# Patient Record
Sex: FEMALE | Race: WHITE | Employment: OTHER | URBAN - METROPOLITAN AREA
[De-identification: names, ages, dates, MRNs, and addresses within clinical notes are randomized per-mention and may not be internally consistent; named-entity substitution may affect disease eponyms.]

---

## 2018-02-23 RX ORDER — CLONAZEPAM 0.12 MG/1
TABLET, ORALLY DISINTEGRATING ORAL
Refills: 0 | Status: ON HOLD | COMMUNITY
Start: 2017-12-06 | End: 2019-04-16 | Stop reason: SDUPTHER

## 2018-02-23 RX ORDER — LISINOPRIL 40 MG/1
20 TABLET ORAL DAILY
COMMUNITY

## 2018-02-23 RX ORDER — AMLODIPINE BESYLATE 2.5 MG/1
10 TABLET ORAL DAILY
COMMUNITY

## 2018-02-23 RX ORDER — BUDESONIDE AND FORMOTEROL FUMARATE DIHYDRATE 160; 4.5 UG/1; UG/1
AEROSOL RESPIRATORY (INHALATION)
COMMUNITY
Start: 2017-12-14

## 2018-02-23 RX ORDER — ASPIRIN 325 MG
1 TABLET, DELAYED RELEASE (ENTERIC COATED) ORAL
COMMUNITY

## 2018-02-23 RX ORDER — ATORVASTATIN CALCIUM 40 MG/1
40 TABLET, FILM COATED ORAL DAILY
COMMUNITY

## 2018-02-23 RX ORDER — GUAIFENESIN 100 MG/5ML
81 LIQUID (ML) ORAL DAILY
COMMUNITY

## 2018-02-23 RX ORDER — HYDROCHLOROTHIAZIDE 12.5 MG/1
12.5 TABLET ORAL DAILY
COMMUNITY
End: 2019-04-16

## 2018-02-23 RX ORDER — LAMOTRIGINE 100 MG/1
TABLET ORAL
Refills: 0 | COMMUNITY
Start: 2017-12-26

## 2018-02-23 RX ORDER — PHENOL/SODIUM PHENOLATE
40 AEROSOL, SPRAY (ML) MUCOUS MEMBRANE DAILY
COMMUNITY

## 2018-02-23 RX ORDER — LEVOTHYROXINE SODIUM 88 UG/1
100 TABLET ORAL
COMMUNITY

## 2018-02-27 ENCOUNTER — ANESTHESIA EVENT (OUTPATIENT)
Dept: ENDOSCOPY | Age: 73
End: 2018-02-27
Payer: MEDICARE

## 2018-02-28 ENCOUNTER — ANESTHESIA (OUTPATIENT)
Dept: ENDOSCOPY | Age: 73
End: 2018-02-28
Payer: MEDICARE

## 2018-02-28 ENCOUNTER — HOSPITAL ENCOUNTER (OUTPATIENT)
Age: 73
Setting detail: OUTPATIENT SURGERY
Discharge: HOME | End: 2018-02-28
Attending: INTERNAL MEDICINE | Admitting: INTERNAL MEDICINE
Payer: MEDICARE

## 2018-02-28 VITALS
BODY MASS INDEX: 28.36 KG/M2 | WEIGHT: 135.13 LBS | HEIGHT: 58 IN | OXYGEN SATURATION: 97 % | TEMPERATURE: 98.5 F | SYSTOLIC BLOOD PRESSURE: 129 MMHG | RESPIRATION RATE: 16 BRPM | DIASTOLIC BLOOD PRESSURE: 61 MMHG | HEART RATE: 62 BPM

## 2018-02-28 PROBLEM — Z86.010 ENCOUNTER FOR COLONOSCOPY DUE TO HISTORY OF ADENOMATOUS COLONIC POLYPS: Status: ACTIVE | Noted: 2018-02-28

## 2018-02-28 PROBLEM — K22.70 BARRETT'S ESOPHAGUS DETERMINED BY BIOPSY: Status: ACTIVE | Noted: 2018-02-28

## 2018-02-28 PROBLEM — Z12.11 ENCOUNTER FOR COLONOSCOPY DUE TO HISTORY OF ADENOMATOUS COLONIC POLYPS: Status: ACTIVE | Noted: 2018-02-28

## 2018-02-28 LAB
BUN BLD-MCNC: 16 MG/DL (ref 7–18)
CHLORIDE BLD-SCNC: 103 MMOL/L (ref 100–108)
GLUCOSE BLD STRIP.AUTO-MCNC: 92 MG/DL (ref 74–106)
HCT VFR BLD CALC: 35 % (ref 36–49)
HGB BLD-MCNC: 11.9 G/DL (ref 12–16)
POTASSIUM BLD-SCNC: 3.8 MMOL/L (ref 3.5–5.5)
SODIUM BLD-SCNC: 140 MMOL/L (ref 136–145)

## 2018-02-28 PROCEDURE — 88342 IMHCHEM/IMCYTCHM 1ST ANTB: CPT | Performed by: INTERNAL MEDICINE

## 2018-02-28 PROCEDURE — 74011250636 HC RX REV CODE- 250/636

## 2018-02-28 PROCEDURE — 74011250636 HC RX REV CODE- 250/636: Performed by: NURSE ANESTHETIST, CERTIFIED REGISTERED

## 2018-02-28 PROCEDURE — 77030038604 HC SNR ENDO EXACTO USEN -B: Performed by: INTERNAL MEDICINE

## 2018-02-28 PROCEDURE — 74011000250 HC RX REV CODE- 250

## 2018-02-28 PROCEDURE — 76040000007: Performed by: INTERNAL MEDICINE

## 2018-02-28 PROCEDURE — 76060000032 HC ANESTHESIA 0.5 TO 1 HR: Performed by: INTERNAL MEDICINE

## 2018-02-28 PROCEDURE — 84295 ASSAY OF SERUM SODIUM: CPT

## 2018-02-28 PROCEDURE — 77030009426 HC FCPS BIOP ENDOSC BSC -B: Performed by: INTERNAL MEDICINE

## 2018-02-28 PROCEDURE — 77030031670 HC DEV INFL ENDOTEK BIG60 MRTM -B: Performed by: INTERNAL MEDICINE

## 2018-02-28 PROCEDURE — 88305 TISSUE EXAM BY PATHOLOGIST: CPT | Performed by: INTERNAL MEDICINE

## 2018-02-28 RX ORDER — SODIUM CHLORIDE 9 MG/ML
25 INJECTION, SOLUTION INTRAVENOUS CONTINUOUS
Status: CANCELLED | OUTPATIENT
Start: 2018-02-28 | End: 2018-02-28

## 2018-02-28 RX ORDER — SODIUM CHLORIDE, SODIUM LACTATE, POTASSIUM CHLORIDE, CALCIUM CHLORIDE 600; 310; 30; 20 MG/100ML; MG/100ML; MG/100ML; MG/100ML
75 INJECTION, SOLUTION INTRAVENOUS CONTINUOUS
Status: DISCONTINUED | OUTPATIENT
Start: 2018-02-28 | End: 2018-02-28 | Stop reason: HOSPADM

## 2018-02-28 RX ORDER — HYDRALAZINE HYDROCHLORIDE 20 MG/ML
INJECTION INTRAMUSCULAR; INTRAVENOUS AS NEEDED
Status: DISCONTINUED | OUTPATIENT
Start: 2018-02-28 | End: 2018-02-28 | Stop reason: HOSPADM

## 2018-02-28 RX ORDER — SODIUM CHLORIDE 0.9 % (FLUSH) 0.9 %
5-10 SYRINGE (ML) INJECTION EVERY 8 HOURS
Status: CANCELLED | OUTPATIENT
Start: 2018-02-28 | End: 2018-02-28

## 2018-02-28 RX ORDER — LIDOCAINE HYDROCHLORIDE 10 MG/ML
0.1 INJECTION, SOLUTION EPIDURAL; INFILTRATION; INTRACAUDAL; PERINEURAL AS NEEDED
Status: DISCONTINUED | OUTPATIENT
Start: 2018-02-28 | End: 2018-02-28 | Stop reason: HOSPADM

## 2018-02-28 RX ORDER — SODIUM CHLORIDE 0.9 % (FLUSH) 0.9 %
5-10 SYRINGE (ML) INJECTION AS NEEDED
Status: CANCELLED | OUTPATIENT
Start: 2018-02-28 | End: 2018-02-28

## 2018-02-28 RX ORDER — FAMOTIDINE 20 MG/1
20 TABLET, FILM COATED ORAL ONCE
Status: DISCONTINUED | OUTPATIENT
Start: 2018-02-28 | End: 2018-02-28 | Stop reason: HOSPADM

## 2018-02-28 RX ORDER — LIDOCAINE HYDROCHLORIDE 20 MG/ML
INJECTION, SOLUTION EPIDURAL; INFILTRATION; INTRACAUDAL; PERINEURAL AS NEEDED
Status: DISCONTINUED | OUTPATIENT
Start: 2018-02-28 | End: 2018-02-28 | Stop reason: HOSPADM

## 2018-02-28 RX ORDER — DIPHENHYDRAMINE HYDROCHLORIDE 50 MG/ML
25 INJECTION, SOLUTION INTRAMUSCULAR; INTRAVENOUS
Status: CANCELLED | OUTPATIENT
Start: 2018-02-28

## 2018-02-28 RX ORDER — DEXTROMETHORPHAN/PSEUDOEPHED 2.5-7.5/.8
1.2 DROPS ORAL
Status: CANCELLED | OUTPATIENT
Start: 2018-02-28

## 2018-02-28 RX ORDER — PROPOFOL 10 MG/ML
INJECTION, EMULSION INTRAVENOUS AS NEEDED
Status: DISCONTINUED | OUTPATIENT
Start: 2018-02-28 | End: 2018-02-28 | Stop reason: HOSPADM

## 2018-02-28 RX ORDER — PROPOFOL 10 MG/ML
INJECTION, EMULSION INTRAVENOUS
Status: DISCONTINUED | OUTPATIENT
Start: 2018-02-28 | End: 2018-02-28 | Stop reason: HOSPADM

## 2018-02-28 RX ORDER — SODIUM CHLORIDE, SODIUM LACTATE, POTASSIUM CHLORIDE, CALCIUM CHLORIDE 600; 310; 30; 20 MG/100ML; MG/100ML; MG/100ML; MG/100ML
75 INJECTION, SOLUTION INTRAVENOUS CONTINUOUS
Status: CANCELLED | OUTPATIENT
Start: 2018-02-28

## 2018-02-28 RX ADMIN — SODIUM CHLORIDE, SODIUM LACTATE, POTASSIUM CHLORIDE, AND CALCIUM CHLORIDE 75 ML/HR: 600; 310; 30; 20 INJECTION, SOLUTION INTRAVENOUS at 10:49

## 2018-02-28 RX ADMIN — PROPOFOL 200 MCG/KG/MIN: 10 INJECTION, EMULSION INTRAVENOUS at 11:17

## 2018-02-28 RX ADMIN — HYDRALAZINE HYDROCHLORIDE 2.5 MG: 20 INJECTION INTRAMUSCULAR; INTRAVENOUS at 11:39

## 2018-02-28 RX ADMIN — HYDRALAZINE HYDROCHLORIDE 5 MG: 20 INJECTION INTRAMUSCULAR; INTRAVENOUS at 11:25

## 2018-02-28 RX ADMIN — HYDRALAZINE HYDROCHLORIDE 2.5 MG: 20 INJECTION INTRAMUSCULAR; INTRAVENOUS at 11:35

## 2018-02-28 RX ADMIN — LIDOCAINE HYDROCHLORIDE 50 MG: 20 INJECTION, SOLUTION EPIDURAL; INFILTRATION; INTRACAUDAL; PERINEURAL at 11:16

## 2018-02-28 RX ADMIN — PROPOFOL 60 MG: 10 INJECTION, EMULSION INTRAVENOUS at 11:17

## 2018-02-28 NOTE — IP AVS SNAPSHOT
[address] [address] [address] 
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 
[phone] Discharge Orders None A check mary indicates which time of day the medication should be taken. My Medications CONTINUE taking these medications Instructions Each Dose to Equal  
 Morning Noon Evening Bedtime  
 aspirin 81 mg chewable tablet Your last dose was: Your next dose is: Take 81 mg by mouth daily. 81 mg Cholecalciferol (Vitamin D3) 50,000 unit Cap Your last dose was: Your next dose is: Take 1 Cap by mouth every seven (7) days. 1 Cap  
    
   
   
   
  
 clonazePAM 0.125 mg disintegrating tablet Commonly known as:  Merilee Blind Your last dose was: Your next dose is:    
   
   
 place 1 tablet by mouth once daily if needed for anxiety  
     
   
   
   
  
 hydroCHLOROthiazide 12.5 mg tablet Commonly known as:  HYDRODIURIL Your last dose was: Your next dose is: Take 12.5 mg by mouth daily. 12.5 mg  
    
   
   
   
  
 lamoTRIgine 100 mg tablet Commonly known as: LaMICtal  
   
Your last dose was: Your next dose is:    
   
   
 take 2 tablets by mouth at bedtime  
     
   
   
   
  
 levothyroxine 88 mcg tablet Commonly known as:  SYNTHROID  
 Your last dose was: Your next dose is: Take 88 mcg by mouth Daily (before breakfast). 88 mcg LIPITOR 40 mg tablet Generic drug:  atorvastatin Your last dose was: Your next dose is: Take 40 mg by mouth daily. 40 mg  
    
   
   
   
  
 lisinopril 40 mg tablet Commonly known as:  Alcario Phuc Your last dose was: Your next dose is: Take 40 mg by mouth daily. 40 mg  
    
   
   
   
  
 NORVASC 2.5 mg tablet Generic drug:  amLODIPine Your last dose was: Your next dose is: Take 2.5 mg by mouth daily. 2.5 mg Omeprazole delayed release 20 mg tablet Commonly known as:  PRILOSEC D/R Your last dose was: Your next dose is: Take 20 mg by mouth daily. 20 mg  
    
   
   
   
  
 SYMBICORT 160-4.5 mcg/actuation Hfaa Generic drug:  budesonide-formoterol Your last dose was: Your next dose is:    
   
   
      
   
   
   
  
  
  
  
Discharge Instructions RECOMMENDATIONS: 
1. Call me in 2-3 weeks for esophageal biopsies. If no dysplasia, then repeat EGD in 3 years 2. Continue current anti-reflux medication 3. Tino me in 2-3 weeks for polyp biopsy result if not received beforehand. If adenoma or hyperplastic, repeat colonoscopy in 5 years b/o adenomas in the past 
 
 
  
Upper GI Endoscopy: What to Expect at [facility] Your Recovery After you have an endoscopy, you will stay at the hospital or clinic for 1 to 2 hours. This will allow the medicine to wear off. You will be able to go home after your doctor or nurse checks to make sure you are not having any problems.  
You may have to stay overnight if you had treatment during the test. You may have a sore throat for a day or two after the test. 
This care sheet gives you a general idea about what to expect after the test. 
 How can you care for yourself at home? Activity · Rest as much as you need to after you go home. · You should be able to go back to your usual activities the day after the test. 
Diet · Follow your doctor's directions for eating after the test. 
· Drink plenty of fluids (unless your doctor has told you not to). Medications · If you have a sore throat the day after the test, use an over-the-counter spray to numb your throat. Follow-up care is a key part of your treatment and safety. Be sure to make and go to all appointments, and call your doctor if you are having problems. It's also a good idea to know your test results and keep a list of the medicines you take. When should you call for help? Call 911 anytime you think you may need emergency care. For example, call if: 
? · You passed out (loses consciousness). ? · You have trouble breathing. ? · You pass maroon or bloody stools. ?Call your doctor now or seek immediate medical care if: 
? · You have pain that does not get better after your take pain medicine. ? · You have new or worse belly pain. ? · You have blood in your stools. ? · You are sick to your stomach and cannot keep fluids down. ? · You have a fever. ? · You cannot pass stools or gas. ? Watch closely for changes in your health, and be sure to contact your doctor if: 
? · Your throat still hurts after a day or two. ? · You do not get better as expected. Where can you learn more? Go to http://nahed-yasmeen.info/. Enter [phone] in the search box to learn more about \"Upper GI Endoscopy: What to Expect at Home. \" Current as of: May 12, 2017 Content Version: 11.4 © 5916-7628 [employer]. Care instructions adapted under license by [employer] (which disclaims liability or warranty for this information).  If you have questions about a medical condition or this instruction, always ask your healthcare professional. Jania Sanz, [facility] disclaims any warranty or liability for your use of this information. Colonoscopy: What to Expect at [facility] Your Recovery After you have a colonoscopy, you will stay at the clinic for 1 to 2 hours until the medicines wear off. Then you can go home. But you will need to arrange for a ride. Your doctor will tell you when you can eat and do your other usual activities. Your doctor will talk to you about when you will need your next colonoscopy. Your doctor can help you decide how often you need to be checked. This will depend on the results of your test and your risk for colorectal cancer. After the test, you may be bloated or have gas pains. You may need to pass gas. If a biopsy was done or a polyp was removed, you may have streaks of blood in your stool (feces) for a few days. This care sheet gives you a general idea about how long it will take for you to recover. But each person recovers at a different pace. Follow the steps below to get better as quickly as possible. How can you care for yourself at home? Activity ? · Rest when you feel tired. ? · You can do your normal activities when it feels okay to do so. Diet ? · Follow your doctor's directions for eating. ? · Unless your doctor has told you not to, drink plenty of fluids. This helps to replace the fluids that were lost during the colon prep. ? · Do not drink alcohol. Medicines ? · Your doctor will tell you if and when you can restart your medicines. He or she will also give you instructions about taking any new medicines. ? · If you take blood thinners, such as warfarin (Coumadin), clopidogrel (Plavix), or aspirin, be sure to talk to your doctor. He or she will tell you if and when to start taking those medicines again. Make sure that you understand exactly what your doctor wants you to do.   
? · If polyps were removed or a biopsy was done during the test, your doctor may tell you not to take aspirin or other anti-inflammatory medicines for a few days. These include ibuprofen (Advil, Motrin) and naproxen (Aleve). Other instructions ? · For your safety, do not drive or operate machinery until the medicine wears off and you can think clearly. Your doctor may tell you not to drive or operate machinery until the day after your test.  
? · Do not sign legal documents or make major decisions until the medicine wears off and you can think clearly. The anesthesia can make it hard for you to fully understand what you are agreeing to. Follow-up care is a key part of your treatment and safety. Be sure to make and go to all appointments, and call your doctor if you are having problems. It's also a good idea to know your test results and keep a list of the medicines you take. When should you call for help? Call 911 anytime you think you may need emergency care. For example, call if: 
? · You passed out (lost consciousness). ? · You pass maroon or bloody stools. ? · You have trouble breathing. ?Call your doctor now or seek immediate medical care if: 
? · You have pain that does not get better after you take pain medicine. ? · You are sick to your stomach or cannot drink fluids. ? · You have new or worse belly pain. ? · You have blood in your stools. ? · You have a fever. ? · You cannot pass stools or gas. ? Watch closely for changes in your health, and be sure to contact your doctor if you have any problems. Where can you learn more? Go to http://nahed-yasmeen.info/. Enter E264 in the search box to learn more about \"Colonoscopy: What to Expect at Home. \" Current as of: May 12, 2017 Content Version: 11.4 © 2913-6566 [employer]. Care instructions adapted under license by [employer] (which disclaims liability or warranty for this information).  If you have questions about a medical condition or this instruction, always ask your healthcare professional. [address] [address] any warranty or liability for your use of this information. DISCHARGE SUMMARY from Nurse PATIENT INSTRUCTIONS: 
 
After general anesthesia or intravenous sedation, for 24 hours or while taking prescription Narcotics: · Limit your activities · Do not drive and operate hazardous machinery · Do not make important personal or business decisions · Do  not drink alcoholic beverages · If you have not urinated within 8 hours after discharge, please contact your surgeon on call. Report the following to your surgeon: 
· Excessive pain, swelling, redness or odor of or around the surgical area · Temperature over 100.5 · Nausea and vomiting lasting longer than 4 hours or if unable to take medications · Any signs of decreased circulation or nerve impairment to extremity: change in color, persistent  numbness, tingling, coldness or increase pain · Any questions What to do at Home: No smoking/ No tobacco products/ Avoid exposure to second hand smoke Surgeon General's Warning:  Quitting smoking now greatly reduces serious risk to your health. Obesity, smoking, and sedentary lifestyle greatly increases your risk for illness A healthy diet, regular physical exercise & weight monitoring are important for maintaining a healthy lifestyle You may be retaining fluid if you have a history of heart failure or if you experience any of the following symptoms:  Weight gain of 3 pounds or more overnight or 5 pounds in a week, increased swelling in our hands or feet or shortness of breath while lying flat in bed. Please call your doctor as soon as you notice any of these symptoms; do not wait until your next office visit. Recognize signs and symptoms of STROKE: 
 
F-face looks uneven A-arms unable to move or move unevenly S-speech slurred or non-existent T-time-call 911 as soon as signs and symptoms begin-DO NOT go Back to bed or wait to see if you get better-TIME IS BRAIN. Warning Signs of HEART ATTACK Call 911 if you have these symptoms: 
? Chest discomfort. Most heart attacks involve discomfort in the center of the chest that lasts more than a few minutes, or that goes away and comes back. It can feel like uncomfortable pressure, squeezing, fullness, or pain. ? Discomfort in other areas of the upper body. Symptoms can include pain or discomfort in one or both arms, the back, neck, jaw, or stomach. ? Shortness of breath with or without chest discomfort. ? Other signs may include breaking out in a cold sweat, nausea, or lightheadedness. Don't wait more than five minutes to call [address]! Fast action can save your life. Calling 911 is almost always the fastest way to get lifesaving treatment. Emergency Medical Services staff can begin treatment when they arrive  up to an hour sooner than if someone gets to the hospital by car. The discharge information has been reviewed with the patient. The patient verbalized understanding. Discharge medications reviewed with the patient and appropriate educational materials and side effects teaching were provided. Patient armband removed and given to patient to take home. Patient was informed of the privacy risks if armband lost or stolen Introducing [facility]! [employer] introduces [employer] patient portal. Now you can access parts of your medical record, email your doctor's office, and request medication refills online. 1. In your internet browser, go to [email or web address] 2. Click on the First Time User? Click Here link in the Sign In box. You will see the New Member Sign Up page. 3. Enter your [employer] Access Code exactly as it appears below. You will not need to use this code after youve completed the sign-up process.  If you do not sign up before the expiration date, you must request a new code. · Pharmacopeia Access Code: [email or web address] Expires: 4/8/2018  8:40 AM 
 
4. Enter the last four digits of your Social Security Number (xxxx) and Date of Birth (mm/dd/yyyy) as indicated and click Submit. You will be taken to the next sign-up page. 5. Create a Pharmacopeia ID. This will be your Pharmacopeia login ID and cannot be changed, so think of one that is secure and easy to remember. 6. Create a Pharmacopeia password. You can change your password at any time. 7. Enter your Password Reset Question and Answer. This can be used at a later time if you forget your password. 8. Enter your e-mail address. You will receive e-mail notification when new information is available in [address] 9. Click Sign Up. You can now view and download portions of your medical record. 10. Click the Download Summary menu link to download a portable copy of your medical information. If you have questions, please visit the Frequently Asked Questions section of the Pharmacopeia website. Remember, Pharmacopeia is NOT to be used for urgent needs. For medical emergencies, dial 911. Now available from your iPhone and Android! Providers Seen During Your Hospitalization Provider Specialty Primary office phone Ara Adkins MD Gastroenterology [phone] Your Primary Care Physician (PCP) Primary Care Physician Office Phone Office Fax Cindy Esquivel [phone] You are allergic to the following No active allergies Recent Documentation Height Weight Breastfeeding? BMI OB Status Smoking Status 1.473 m 61.3 kg No 28.24 kg/m2 Menopause Former Smoker Emergency Contacts Name Discharge Info Relation Home Work Mobile [address] CAREGIVER [3] Friend [5] [phone] Patient Belongings The following personal items are in your possession at time of discharge: Dental Appliances: At bedside  Visual Aid: Glasses, At bedside          Jewelry: Necklace, With patient  Clothing: Footwear, Pants, Shirt    Other Valuables: Eyeglasses Please provide this summary of care documentation to your next provider. Signatures-by signing, you are acknowledging that this After Visit Summary has been reviewed with you and you have received a copy. Patient Signature:  ____________________________________________________________ Date:  ____________________________________________________________  
  
Sagrario Deleon Provider Signature:  ____________________________________________________________ Date:  ____________________________________________________________

## 2018-02-28 NOTE — PROCEDURES
Endoscopy Procedure Note    Patient: Pawan Montano MRN: [id number]  SSN: [id number]    YOB: 1945  Age: 74 y.o. Sex: female      Date/Time:  2/28/2018 11:52 AM    Esophagogastroduodenoscopy (EGD) Procedure Report    Procedure: Esophagogastroduodenoscopy with biopsy    IMPRESSION:   1. Irregular Z-line with 2-3 mm undulations of gastric mucosa extending upward into the distal end of the tubular esophagus in several areas most easily seen with NBI and 2-3 small islands of gastric mucosa  from the Z line by normal squamous mucosa. Bx'd taken to r/o dysplasia to r/o neoplasm. No active esophagitis, mass, nodularity stricture  2.  2 cm hiatal hernia  3. Puckered mucosa greater curve mid-body---based biopsied to confirm healed ulcer---normal mucosal texture  4  Normal duodenal bulb and second portion    RECOMMENDATIONS:  1. Call me in 2-3 weeks for esophageal biopsies. If no dysplasia, then repeat EGD in 3 years  2. Continue current anti-reflux medication    Indication: Stone's esophagus----4-5 year surveillance to r/o dysplasia  :  Luly Rodarte MD  Referring Provider: Adam Ng MD  Assistants: Endoscopy RN-1: Manuel Hansen RN; Gail Degroot RN  History: The history and physical exam were reviewed and updated. Endoscope: [id number]  ASA: ASA 2 - Patient with mild systemic disease with no functional limitations  Mallampati: II (soft palate, uvula, fauces visible)  Sedation:  MAC anesthesia    Description of the procedure: The procedure was discussed with the patient including risks, benefits, alternatives including risks of iv sedation, bleeding, perforation and aspiration. A safety timeout was performed. The patient was placed in the left lateral decubitus position. A bite block was placed. The patient was given incremental doses of intravenous medication until moderate sedation was achieved.   The patients vital signs were monitored at all times including heart rate/rhythm, blood pressure and oxygen saturation. The endoscope was then passed under direct visualization into the esophagus, across the GE junction into the stomach and through the pylorus into the duodenal bulb and second portion. The endoscope was then slowly withdrawn while visualizing the mucosa. In the stomach a retroflexion was performed and gastric fundus and cardia visualized. .The endoscope was then slowly withdrawn. The patient was then transferred to recovery in stable condition. Findings: see impression above    Complications:   none    EBL: minimal    Discharge disposition:  Home in the company of  when able to ambulate    Dilcia Spencer.  MD [employer], Umair Hernandez, [address]  February 28, 2018  11:52 AM

## 2018-02-28 NOTE — PROCEDURES
Colonoscopy Report    Patient: Raymundo Young MRN: [id number]  SSN: [id number]    YOB: 1945  Age: 74 y.o. Sex: female      Date of Procedure: 2/28/2018    IMPRESSION:  1. Good prep---normal terminal ileal orfice   2.  2 polyps in the cecum removed with cold snare---6-9 mm sessile polyps as were 5-6 mm sessile polyps in the distal transverse colon and proximal left colon  3.  2 polyps removed with cold biopsy in the rectum  4. Mild diverticulosis sigmoid and left colon    RECOMMENDATIONS:  1. Call me in 2-3 weeks for polyp biopsy results if not received beforehand. If adenoma or hyperplastic, then repeat colon 5 years b/o adenomas in the past    Indication:  Hx adenomas--5 year exam  History: The history and physical exam were reviewed and updated. Procedure Performed: Colonoscopy biopsy, polypectomy (cold snare)  Endoscopist: Francesco Mosqueda MD  Assistant: Endoscopy RN-1: Angélica Olson RN; Sonia Horn RN   ASA: ASA 2 - Patient with mild systemic disease with no functional limitations  Mallampati Score: II (soft palate, uvula, fauces visible)  Sedation:  MAC anesthesia  Endoscope: [id number]  Extent of Examination:terminal ileum  Quality of Preparation: good    Technique: The procedure was discussed with the patient including risks, benefits, alternatives including risks of IV sedation, bleeding, perforation and missed polyp. A safety timeout was performed. The patient was placed in left lateral position. The patient was given incremental doses of IV medication to achieve moderate  sedation. The patients vital signs were monitored at all times including heart rate, rhythm, blood pressure and oxygen saturation. When adequate sedation was achieved a perianal inspection and a digital rectal exam were performed. The video colonoscope was introduced into the rectum and advanced under direct vision up to the cecum and into the terminal ileal orfice.   The cecum was identified by IC valve, appendiceal orifice and convergence of the tineal folds. Careful examination of the colonic mucosa was then performed on slow withdrawal of the endoscope. Retroflexion was performed in the rectum and the distal rectum visualized as was the anal canal.  The patient tolerated the procedure very well and was transferred to recovery area. Findings:  See impression above  EBL: minimal  Specimen:   ID Type Source Tests Collected by Time Destination   1 : bx healed ulcer at base Preservative Gastric  Polina Christianson MD 2/28/2018 1122 Pathology   2 : bx known barrets r/o dysplasia Preservative   Polina Christianson MD 2/28/2018 1122 Pathology   3 : cold snare polyps x2 Preservative Cecum  Polina Christianson MD 2/28/2018 1137 Pathology   4 : cold snare polyp distal transverse Preservative Colon, Transverse  Polina Christianson MD 2/28/2018 1142 Pathology   5 : cold snare polyp proximal left Preservative Colon  Polina Christianson MD 2/28/2018 1147 Pathology   6 : bx polyps x2 Preservative Rectum  Polina Christianson MD 2/28/2018 1150 Pathology       [address]  MD Justus, Gutierrez Preston, [facility]  February 28, 2018  12:11 PM

## 2018-02-28 NOTE — H&P
Reason for Appointment   1. Management of medical disease and medications prior to endoscopy   2. History of colon polyp     History of Present Illness   Gastroenterology:   The patient is a 74year old female who has been sent for a surveillance colonoscopy. The patient reports her last colonoscopy was done seven years ago by Dr. Debby Kimbrough, which revealed adenoma and hyperplastic polyps. She provides no explanation today as to why she is several years late in having her 5 year follow-up colonoscopy. She denies any active GI symptoms in talking to her today. The patient denies any abdominal pain, rectal bleeding or change in bowel habits. Current Medications   Taking    omeprazole 40 mg delayed release capsule 1 cap(s) orally once a day    Aspir 81 81 mg delayed release tablet 1 tab(s) orally once a day    Deplin 7.5 mg capsule 1 cap(s) orally once a day    citalopram 20 mg tablet 1 tab(s) orally once a day    clonazepam 0.125 mg tablet, disintegrating (Schedule IV Drug)    lamotrigine 100 mg tablet    atorvastatin 40 mg tablet 1 tab(s) orally once a day    escitalopram 10 mg tablet 1 tab(s) orally once a day    hydrochlorothiazide 12.5 mg tablet    Vitamin D 50,000 intl units capsule    levothyroxine 88 mcg (0.088 mg) tablet 1 tab(s) orally once a day    Discontinued    patient to call with meds patient to call with meds    Medication List reviewed and reconciled with the patient      Past Medical History   Stone's Esophagus. Colon polyp. Surgical History   Colonoscopy    EGD    DJD      Family History   Father: , Throat cancer, diagnosed with Cancer   Mother:    Negative for colonic polyps. Social History   Marital Status: . Occupation: Retired. Smoking   Tobacco use: former smoker Quit in   Patient uses other tobacco products: No  e-Cigarettes No  no Alcohol. no IV Drug use. no Drugs (Illicit).    no Blood transfusions, The patient has never received a blood transfusion. Allergies   N.K.D.A. Review of Systems   Complete review of systems was taken and reviewed with the patient on 12/06/2017 and will be scanned into the medical record. Positives will be noted in HPI. Negatives will not be listed here. Vital Signs   BMI 27.26, HR 66, /87, Wt 135, Ht 4'11\", RR 16.     Examination   Gastroenterology:  acut EGD July 2012 Dr Marlyn Valdez indication chronic acid reflux disease. Findings included short segment Stone's esophagus, 2 cm hiatal hernia, erythema in the stomach compatible with gastritis. Biopsies of the irregular Z line did in fact confirm Stone's esophagus. There was no dysplasia. Gastric biopsies were negative for H. pylori. They showed reactive gastritis. EGD 2010: Healed ulcer in the stomach, Stone's esophagus. Hiatal hernia. Physical Examination   General: Healthy-appearing pleasant female in no obvious distress. Nodes: No supraclavicular, axillary, cervical nodes palpable. Chest: Clear to auscultation with symmetric breath sounds and good air movement. Heart: First and second heart sounds are normal. No murmurs, rubs, gallops. Abdomen: Soft, nontender. Mass palpable. Bowel sounds are normal with no bruits. Assessments     1. History of colon polyps - Z86.010 (Primary), Adenomatous polyps in the past. Maximum 5 year surveillance colonoscopy interval is appropriate. She's 2 years overdue for her 5 year exam.     2. Barretts esophagus without dysplasia - K22.70, Stone's esophagus identified on EGD in 2000 and again in 2012. She is overdue by several years for her three-year follow-up endoscopy to rebiopsy the Stone's segment and again rule out dysplasia. She has no significant acid reflux breakthrough symptoms to report today or alarming symptoms. 3. Essential hypertension - I10     4. Other hyperlipidemia - E78.4     Treatment   1.  History of colon polyps   Start PEG - 3350 * with electrolytes powder for reconstitution, -, 240 mL, orally, as directed, 2 days, 1 jug, Refills 0  Start Metoclopramide Hydrochloride tablet, 10 mg, 1 tab(s), orally, 1 hr prior to each colon prep, 2 days, 2 tabs, Refills 0  IMAGING: Colonic Sitz Marker Transit Study: KUB at baseline, then on Day #3, then on Day #5. (Ordered for 01/09/2018)     BasMandy Santana 12/6/2017 11:34:57 AM > Order given to pt. She is going to [facility]. IMAGING: Colonoscopy with MODERATE SEDATION (Ordered for 01/09/2018)  Notes: 1, Colonoscopy is being scheduled for January 9 11:30 AM [facility]. Low residue diet for 1 week prior to procedure. PEG 3350/Reglan prep. 2. Maximum 5 year surveillance colonoscopy interval because of high-risk personal history and adenomatous polyps. 2. Barretts esophagus without dysplasia   IMAGING: EGD with MODERATE SEDATION in HOSPITAL (Ordered for 01/09/2018)  Notes: 1. Continue taking omeprazole 40 mg daily which is best taken 30 minutes before breakfast on a consistent basis. 2. EGD January 9 11:30 at [facility] to rebiopsy the Stone's segment and again rule out dysplasia. 3. The patient should notify me in between office visits despite regular omeprazole use she develops significant acid reflux breakthrough symptoms or difficulty swallowing.             No interval change in history or physical exam since last office visit

## 2018-02-28 NOTE — DISCHARGE INSTRUCTIONS
RECOMMENDATIONS:  1. Call me in 2-3 weeks for esophageal biopsies. If no dysplasia, then repeat EGD in 3 years  2. Continue current anti-reflux medication  3. Tino me in 2-3 weeks for polyp biopsy result if not received beforehand. If adenoma or hyperplastic, repeat colonoscopy in 5 years  b/o adenomas in the past                 Upper GI Endoscopy: What to Expect at [address] [place] [address]  After you have an endoscopy, you will stay at the hospital or clinic for 1 to 2 hours. This will allow the medicine to wear off. You will be able to go home after your doctor or nurse checks to make sure you are not having any problems. You may have to stay overnight if you had treatment during the test. You may have a sore throat for a day or two after the test.  This care sheet gives you a general idea about what to expect after the test.  How can you care for yourself at home? Activity  · Rest as much as you need to after you go home. · You should be able to go back to your usual activities the day after the test.  Diet  · Follow your doctor's directions for eating after the test.  · Drink plenty of fluids (unless your doctor has told you not to). Medications  · If you have a sore throat the day after the test, use an over-the-counter spray to numb your throat. Follow-up care is a key part of your treatment and safety. Be sure to make and go to all appointments, and call your doctor if you are having problems. It's also a good idea to know your test results and keep a list of the medicines you take. When should you call for help? Call 911 anytime you think you may need emergency care. For example, call if:  ? · You passed out (loses consciousness). ? · You have trouble breathing. ? · You pass maroon or bloody stools. ?Call your doctor now or seek immediate medical care if:  ? · You have pain that does not get better after your take pain medicine. ? · You have new or worse belly pain.    ? · You have blood in your stools. ? · You are sick to your stomach and cannot keep fluids down. ? · You have a fever. ? · You cannot pass stools or gas. ? Watch closely for changes in your health, and be sure to contact your doctor if:  ? · Your throat still hurts after a day or two. ? · You do not get better as expected. Where can you learn more? Go to http://nahed-yasmeen.info/. Enter (20) [phone] in the search box to learn more about \"Upper GI Endoscopy: What to Expect at Home. \"  Current as of: May 12, 2017  Content Version: 11.4  © 7916-1678 [employer]. Care instructions adapted under license by [employer] (which disclaims liability or warranty for this information). If you have questions about a medical condition or this instruction, always ask your healthcare professional. [place] [address] any warranty or liability for your use of this information. Colonoscopy: What to Expect at [address] [place] [address]  After you have a colonoscopy, you will stay at the clinic for 1 to 2 hours until the medicines wear off. Then you can go home. But you will need to arrange for a ride. Your doctor will tell you when you can eat and do your other usual activities. Your doctor will talk to you about when you will need your next colonoscopy. Your doctor can help you decide how often you need to be checked. This will depend on the results of your test and your risk for colorectal cancer. After the test, you may be bloated or have gas pains. You may need to pass gas. If a biopsy was done or a polyp was removed, you may have streaks of blood in your stool (feces) for a few days. This care sheet gives you a general idea about how long it will take for you to recover. But each person recovers at a different pace. Follow the steps below to get better as quickly as possible. How can you care for yourself at home? Activity  ? · Rest when you feel tired.    ? · You can do your normal activities when it feels okay to do so. Diet  ? · Follow your doctor's directions for eating. ? · Unless your doctor has told you not to, drink plenty of fluids. This helps to replace the fluids that were lost during the colon prep. ? · Do not drink alcohol. Medicines  ? · Your doctor will tell you if and when you can restart your medicines. He or she will also give you instructions about taking any new medicines. ? · If you take blood thinners, such as warfarin (Coumadin), clopidogrel (Plavix), or aspirin, be sure to talk to your doctor. He or she will tell you if and when to start taking those medicines again. Make sure that you understand exactly what your doctor wants you to do. ? · If polyps were removed or a biopsy was done during the test, your doctor may tell you not to take aspirin or other anti-inflammatory medicines for a few days. These include ibuprofen (Advil, Motrin) and naproxen (Aleve). Other instructions  ? · For your safety, do not drive or operate machinery until the medicine wears off and you can think clearly. Your doctor may tell you not to drive or operate machinery until the day after your test.   ? · Do not sign legal documents or make major decisions until the medicine wears off and you can think clearly. The anesthesia can make it hard for you to fully understand what you are agreeing to. Follow-up care is a key part of your treatment and safety. Be sure to make and go to all appointments, and call your doctor if you are having problems. It's also a good idea to know your test results and keep a list of the medicines you take. When should you call for help? Call 911 anytime you think you may need emergency care. For example, call if:  ? · You passed out (lost consciousness). ? · You pass maroon or bloody stools. ? · You have trouble breathing.    ?Call your doctor now or seek immediate medical care if:  ? · You have pain that does not get better after you take pain medicine. ? · You are sick to your stomach or cannot drink fluids. ? · You have new or worse belly pain. ? · You have blood in your stools. ? · You have a fever. ? · You cannot pass stools or gas. ? Watch closely for changes in your health, and be sure to contact your doctor if you have any problems. Where can you learn more? Go to http://nahed-yasmeen.info/. Enter E264 in the search box to learn more about \"Colonoscopy: What to Expect at Home. \"  Current as of: May 12, 2017  Content Version: 11.4  © 3692-4062 [employer]. Care instructions adapted under license by [employer] (which disclaims liability or warranty for this information). If you have questions about a medical condition or this instruction, always ask your healthcare professional. [address] any warranty or liability for your use of this information. DISCHARGE SUMMARY from Nurse    PATIENT INSTRUCTIONS:    After general anesthesia or intravenous sedation, for 24 hours or while taking prescription Narcotics:  · Limit your activities  · Do not drive and operate hazardous machinery  · Do not make important personal or business decisions  · Do  not drink alcoholic beverages  · If you have not urinated within 8 hours after discharge, please contact your surgeon on call. Report the following to your surgeon:  · Excessive pain, swelling, redness or odor of or around the surgical area  · Temperature over 100.5  · Nausea and vomiting lasting longer than 4 hours or if unable to take medications  · Any signs of decreased circulation or nerve impairment to extremity: change in color, persistent  numbness, tingling, coldness or increase pain  · Any questions    What to do at Home:  No smoking/ No tobacco products/ Avoid exposure to second hand smoke  Surgeon General's Warning:  Quitting smoking now greatly reduces serious risk to your health.     Obesity, smoking, and sedentary lifestyle greatly increases your risk for illness    A healthy diet, regular physical exercise & weight monitoring are important for maintaining a healthy lifestyle    You may be retaining fluid if you have a history of heart failure or if you experience any of the following symptoms:  Weight gain of 3 pounds or more overnight or 5 pounds in a week, increased swelling in our hands or feet or shortness of breath while lying flat in bed. Please call your doctor as soon as you notice any of these symptoms; do not wait until your next office visit. Recognize signs and symptoms of STROKE:    F-face looks uneven    A-arms unable to move or move unevenly    S-speech slurred or non-existent    T-time-call 911 as soon as signs and symptoms begin-DO NOT go       Back to bed or wait to see if you get better-TIME IS BRAIN. Warning Signs of HEART ATTACK     Call 911 if you have these symptoms:   Chest discomfort. Most heart attacks involve discomfort in the center of the chest that lasts more than a few minutes, or that goes away and comes back. It can feel like uncomfortable pressure, squeezing, fullness, or pain.  Discomfort in other areas of the upper body. Symptoms can include pain or discomfort in one or both arms, the back, neck, jaw, or stomach.  Shortness of breath with or without chest discomfort.  Other signs may include breaking out in a cold sweat, nausea, or lightheadedness. Don't wait more than five minutes to call 911 - MINUTES MATTER! Fast action can save your life. Calling 911 is almost always the fastest way to get lifesaving treatment. Emergency Medical Services staff can begin treatment when they arrive -- up to an hour sooner than if someone gets to the hospital by car. The discharge information has been reviewed with the patient. The patient verbalized understanding.   Discharge medications reviewed with the patient and appropriate educational materials and side effects teaching were provided. Patient armband removed and given to patient to take home.   Patient was informed of the privacy risks if armband lost or stolen

## 2018-02-28 NOTE — ANESTHESIA PREPROCEDURE EVALUATION
Anesthetic History   No history of anesthetic complications            Review of Systems / Medical History  Patient summary reviewed and pertinent labs reviewed    Pulmonary  Within defined limits                 Neuro/Psych   Within defined limits           Cardiovascular    Hypertension              Exercise tolerance: >4 METS     GI/Hepatic/Renal     GERD           Endo/Other      Hypothyroidism       Other Findings   Comments: Documentation of current medication  Current medications obtained, documented and obtained? YES      Risk Factors for Postoperative nausea/vomiting:       History of postoperative nausea/vomiting? NO       Female? YES       Motion sickness? NO       Intended opioid administration for postoperative analgesia? NO      Smoking Abstinence:  Current Smoker? NO  Elective Surgery? YES  Seen preoperatively by anesthesiologist or proxy prior to day of surgery? YES  Pt abstained from smoking 24 hours prior to anesthesia?  N/A    Preventive care/screening for High Blood Pressure:  Aged 18 years and older: YES  Screened for high blood pressure: YES  Patients with high blood pressure referred to primary care provider   for BP management: YES                 Physical Exam    Airway  Mallampati: II  TM Distance: 4 - 6 cm  Neck ROM: normal range of motion   Mouth opening: Normal     Cardiovascular  Regular rate and rhythm,  S1 and S2 normal,  no murmur, click, rub, or gallop  Rhythm: regular  Rate: normal         Dental    Dentition: Full upper dentures and Lower partial plate     Pulmonary  Breath sounds clear to auscultation               Abdominal  GI exam deferred       Other Findings            Anesthetic Plan    ASA: 3  Anesthesia type: MAC          Induction: Intravenous  Anesthetic plan and risks discussed with: Patient

## 2018-02-28 NOTE — ANESTHESIA POSTPROCEDURE EVALUATION
Post-Anesthesia Evaluation and Assessment    Patient: Angel Kendall MRN: [id number]  SSN: [id number]    YOB: 1945  Age: 74 y.o. Sex: female       Cardiovascular Function/Vital Signs  Visit Vitals    /61 (BP 1 Location: Left arm, BP Patient Position: At rest)    Pulse 62    Temp 36.9 °C (98.5 °F)    Resp 16    Ht 4' 10\" (1.473 m)    Wt 61.3 kg (135 lb 2 oz)    SpO2 97%    Breastfeeding No    BMI 28.24 kg/m2       Patient is status post MAC anesthesia for Procedure(s):  ESOPHAGOGASTRODUODENOSCOPY (EGD)  COLONOSCOPY. Nausea/Vomiting: None    Postoperative hydration reviewed and adequate. Pain:  Pain Scale 1: Numeric (0 - 10) (02/28/18 1156)  Pain Intensity 1: 0 (02/28/18 1156)   Managed    Neurological Status: At baseline    Mental Status and Level of Consciousness: Alert and oriented     Pulmonary Status:   O2 Device: Room air (02/28/18 1153)   Adequate oxygenation and airway patent    Complications related to anesthesia: None    Post-anesthesia assessment completed.  No concerns    Signed By: Christian Ulrich CRNA     February 28, 2018

## 2019-04-10 PROBLEM — E87.6 HYPOKALEMIA: Status: ACTIVE | Noted: 2019-04-10

## 2019-04-10 PROBLEM — J18.9 PNEUMONIA: Status: ACTIVE | Noted: 2019-04-10

## 2019-04-10 PROBLEM — J44.9 COPD (CHRONIC OBSTRUCTIVE PULMONARY DISEASE) (HCC): Status: ACTIVE | Noted: 2019-04-10

## 2020-12-01 ENCOUNTER — APPOINTMENT (OUTPATIENT)
Dept: CT IMAGING | Age: 75
DRG: 640 | End: 2020-12-01
Attending: EMERGENCY MEDICINE
Payer: MEDICARE

## 2020-12-01 ENCOUNTER — HOSPITAL ENCOUNTER (INPATIENT)
Age: 75
LOS: 7 days | Discharge: HOME | DRG: 640 | End: 2020-12-08
Attending: EMERGENCY MEDICINE | Admitting: INTERNAL MEDICINE
Payer: MEDICARE

## 2020-12-01 ENCOUNTER — APPOINTMENT (OUTPATIENT)
Dept: GENERAL RADIOLOGY | Age: 75
DRG: 640 | End: 2020-12-01
Attending: EMERGENCY MEDICINE
Payer: MEDICARE

## 2020-12-01 DIAGNOSIS — C34.31 MALIGNANT NEOPLASM OF LOWER LOBE OF RIGHT LUNG (HCC): ICD-10-CM

## 2020-12-01 DIAGNOSIS — F41.9 ANXIETY: ICD-10-CM

## 2020-12-01 DIAGNOSIS — E03.9 HYPOTHYROIDISM (ACQUIRED): ICD-10-CM

## 2020-12-01 DIAGNOSIS — E83.52 HYPERCALCEMIA: Primary | ICD-10-CM

## 2020-12-01 PROBLEM — I10 HTN (HYPERTENSION): Status: ACTIVE | Noted: 2020-12-01

## 2020-12-01 PROBLEM — J96.10 CHRONIC RESPIRATORY FAILURE (HCC): Status: ACTIVE | Noted: 2020-12-01

## 2020-12-01 PROBLEM — G93.41 METABOLIC ENCEPHALOPATHY: Status: ACTIVE | Noted: 2020-12-01

## 2020-12-01 PROBLEM — C34.90 LUNG CANCER (HCC): Status: ACTIVE | Noted: 2020-12-01

## 2020-12-01 PROBLEM — E87.1 HYPONATREMIA: Status: ACTIVE | Noted: 2020-12-01

## 2020-12-01 PROBLEM — F03.90 DEMENTIA (HCC): Status: ACTIVE | Noted: 2020-12-01

## 2020-12-01 LAB
ALBUMIN SERPL-MCNC: 2.7 G/DL (ref 3.4–5)
ALBUMIN/GLOB SERPL: 0.5 {RATIO} (ref 0.8–1.7)
ALP SERPL-CCNC: 113 U/L (ref 45–117)
ALT SERPL-CCNC: 8 U/L (ref 13–56)
ANION GAP SERPL CALC-SCNC: 8 MMOL/L (ref 3–18)
APPEARANCE UR: CLEAR
AST SERPL-CCNC: 10 U/L (ref 10–38)
BASOPHILS # BLD: 0 K/UL (ref 0–0.1)
BASOPHILS NFR BLD: 0 % (ref 0–2)
BILIRUB SERPL-MCNC: 0.4 MG/DL (ref 0.2–1)
BILIRUB UR QL: NEGATIVE
BNP SERPL-MCNC: 514 PG/ML (ref 0–1800)
BUN SERPL-MCNC: 16 MG/DL (ref 7–18)
BUN/CREAT SERPL: 14 (ref 12–20)
CALCIUM SERPL-MCNC: 13.8 MG/DL (ref 8.5–10.1)
CHLORIDE SERPL-SCNC: 91 MMOL/L (ref 100–111)
CO2 SERPL-SCNC: 32 MMOL/L (ref 21–32)
COLOR UR: YELLOW
CREAT SERPL-MCNC: 1.18 MG/DL (ref 0.6–1.3)
DIFFERENTIAL METHOD BLD: ABNORMAL
EOSINOPHIL # BLD: 0 K/UL (ref 0–0.4)
EOSINOPHIL NFR BLD: 0 % (ref 0–5)
ERYTHROCYTE [DISTWIDTH] IN BLOOD BY AUTOMATED COUNT: 16.7 % (ref 11.6–14.5)
GLOBULIN SER CALC-MCNC: 5 G/DL (ref 2–4)
GLUCOSE SERPL-MCNC: 91 MG/DL (ref 74–99)
GLUCOSE UR STRIP.AUTO-MCNC: NEGATIVE MG/DL
HCT VFR BLD AUTO: 40.6 % (ref 35–45)
HGB BLD-MCNC: 12.3 G/DL (ref 12–16)
HGB UR QL STRIP: NEGATIVE
KETONES UR QL STRIP.AUTO: ABNORMAL MG/DL
LEUKOCYTE ESTERASE UR QL STRIP.AUTO: NEGATIVE
LYMPHOCYTES # BLD: 0.9 K/UL (ref 0.9–3.6)
LYMPHOCYTES NFR BLD: 8 % (ref 21–52)
MAGNESIUM SERPL-MCNC: 1.8 MG/DL (ref 1.6–2.6)
MCH RBC QN AUTO: 25.9 PG (ref 24–34)
MCHC RBC AUTO-ENTMCNC: 30.3 G/DL (ref 31–37)
MCV RBC AUTO: 85.5 FL (ref 74–97)
MONOCYTES # BLD: 0.2 K/UL (ref 0.05–1.2)
MONOCYTES NFR BLD: 1 % (ref 3–10)
NEUTS SEG # BLD: 10.2 K/UL (ref 1.8–8)
NEUTS SEG NFR BLD: 91 % (ref 40–73)
NITRITE UR QL STRIP.AUTO: NEGATIVE
PH UR STRIP: 7 [PH] (ref 5–8)
PLATELET # BLD AUTO: 273 K/UL (ref 135–420)
PMV BLD AUTO: 9.4 FL (ref 9.2–11.8)
POTASSIUM SERPL-SCNC: 3.1 MMOL/L (ref 3.5–5.5)
PROT SERPL-MCNC: 7.7 G/DL (ref 6.4–8.2)
PROT UR STRIP-MCNC: NEGATIVE MG/DL
RBC # BLD AUTO: 4.75 M/UL (ref 4.2–5.3)
SODIUM SERPL-SCNC: 131 MMOL/L (ref 136–145)
SP GR UR REFRACTOMETRY: 1.01 (ref 1–1.03)
T4 FREE SERPL-MCNC: 0.3 NG/DL (ref 0.7–1.5)
TROPONIN I SERPL-MCNC: <0.02 NG/ML (ref 0–0.04)
TSH SERPL DL<=0.05 MIU/L-ACNC: 72.8 UIU/ML (ref 0.36–3.74)
UROBILINOGEN UR QL STRIP.AUTO: 1 EU/DL (ref 0.2–1)
WBC # BLD AUTO: 11.3 K/UL (ref 4.6–13.2)

## 2020-12-01 PROCEDURE — 83735 ASSAY OF MAGNESIUM: CPT

## 2020-12-01 PROCEDURE — 84443 ASSAY THYROID STIM HORMONE: CPT

## 2020-12-01 PROCEDURE — 74011250636 HC RX REV CODE- 250/636: Performed by: EMERGENCY MEDICINE

## 2020-12-01 PROCEDURE — 84484 ASSAY OF TROPONIN QUANT: CPT

## 2020-12-01 PROCEDURE — 85025 COMPLETE CBC W/AUTO DIFF WBC: CPT

## 2020-12-01 PROCEDURE — 87635 SARS-COV-2 COVID-19 AMP PRB: CPT

## 2020-12-01 PROCEDURE — 84439 ASSAY OF FREE THYROXINE: CPT

## 2020-12-01 PROCEDURE — 77030021352 HC CBL LD SYS DISP COVD -B

## 2020-12-01 PROCEDURE — 99285 EMERGENCY DEPT VISIT HI MDM: CPT

## 2020-12-01 PROCEDURE — 84300 ASSAY OF URINE SODIUM: CPT

## 2020-12-01 PROCEDURE — 74011250636 HC RX REV CODE- 250/636: Performed by: INTERNAL MEDICINE

## 2020-12-01 PROCEDURE — 74011000250 HC RX REV CODE- 250: Performed by: INTERNAL MEDICINE

## 2020-12-01 PROCEDURE — 65660000000 HC RM CCU STEPDOWN

## 2020-12-01 PROCEDURE — 74011250637 HC RX REV CODE- 250/637: Performed by: INTERNAL MEDICINE

## 2020-12-01 PROCEDURE — 83880 ASSAY OF NATRIURETIC PEPTIDE: CPT

## 2020-12-01 PROCEDURE — 80053 COMPREHEN METABOLIC PANEL: CPT

## 2020-12-01 PROCEDURE — 71250 CT THORAX DX C-: CPT

## 2020-12-01 PROCEDURE — 2709999900 HC NON-CHARGEABLE SUPPLY

## 2020-12-01 PROCEDURE — 93005 ELECTROCARDIOGRAM TRACING: CPT

## 2020-12-01 PROCEDURE — 71045 X-RAY EXAM CHEST 1 VIEW: CPT

## 2020-12-01 PROCEDURE — 83935 ASSAY OF URINE OSMOLALITY: CPT

## 2020-12-01 PROCEDURE — 81003 URINALYSIS AUTO W/O SCOPE: CPT

## 2020-12-01 RX ORDER — ATORVASTATIN CALCIUM 20 MG/1
40 TABLET, FILM COATED ORAL DAILY
Status: DISCONTINUED | OUTPATIENT
Start: 2020-12-02 | End: 2020-12-08 | Stop reason: HOSPADM

## 2020-12-01 RX ORDER — PROMETHAZINE HYDROCHLORIDE 25 MG/1
12.5 TABLET ORAL
Status: DISCONTINUED | OUTPATIENT
Start: 2020-12-01 | End: 2020-12-08 | Stop reason: HOSPADM

## 2020-12-01 RX ORDER — POTASSIUM CHLORIDE 20 MEQ/1
40 TABLET, EXTENDED RELEASE ORAL
Status: COMPLETED | OUTPATIENT
Start: 2020-12-01 | End: 2020-12-01

## 2020-12-01 RX ORDER — DONEPEZIL HYDROCHLORIDE 5 MG/1
5 TABLET, FILM COATED ORAL
Status: DISCONTINUED | OUTPATIENT
Start: 2020-12-01 | End: 2020-12-08 | Stop reason: HOSPADM

## 2020-12-01 RX ORDER — CITALOPRAM 20 MG/1
20 TABLET, FILM COATED ORAL DAILY
Status: DISCONTINUED | OUTPATIENT
Start: 2020-12-02 | End: 2020-12-08 | Stop reason: HOSPADM

## 2020-12-01 RX ORDER — AMLODIPINE BESYLATE 10 MG/1
10 TABLET ORAL DAILY
Status: DISCONTINUED | OUTPATIENT
Start: 2020-12-02 | End: 2020-12-08 | Stop reason: HOSPADM

## 2020-12-01 RX ORDER — GUAIFENESIN 100 MG/5ML
81 LIQUID (ML) ORAL DAILY
Status: DISCONTINUED | OUTPATIENT
Start: 2020-12-02 | End: 2020-12-08 | Stop reason: HOSPADM

## 2020-12-01 RX ORDER — HEPARIN SODIUM 5000 [USP'U]/ML
5000 INJECTION, SOLUTION INTRAVENOUS; SUBCUTANEOUS EVERY 8 HOURS
Status: DISCONTINUED | OUTPATIENT
Start: 2020-12-01 | End: 2020-12-08 | Stop reason: HOSPADM

## 2020-12-01 RX ORDER — BUDESONIDE 0.5 MG/2ML
500 INHALANT ORAL
Status: DISCONTINUED | OUTPATIENT
Start: 2020-12-01 | End: 2020-12-08 | Stop reason: HOSPADM

## 2020-12-01 RX ORDER — FOLIC ACID 1 MG/1
1 TABLET ORAL DAILY
Status: DISCONTINUED | OUTPATIENT
Start: 2020-12-02 | End: 2020-12-08 | Stop reason: HOSPADM

## 2020-12-01 RX ORDER — IPRATROPIUM BROMIDE AND ALBUTEROL SULFATE 2.5; .5 MG/3ML; MG/3ML
3 SOLUTION RESPIRATORY (INHALATION)
Status: DISCONTINUED | OUTPATIENT
Start: 2020-12-01 | End: 2020-12-08 | Stop reason: HOSPADM

## 2020-12-01 RX ORDER — ERGOCALCIFEROL 1.25 MG/1
50000 CAPSULE ORAL
Status: DISCONTINUED | OUTPATIENT
Start: 2020-12-07 | End: 2020-12-08 | Stop reason: HOSPADM

## 2020-12-01 RX ORDER — LAMOTRIGINE 100 MG/1
100 TABLET ORAL DAILY
Status: DISCONTINUED | OUTPATIENT
Start: 2020-12-02 | End: 2020-12-08 | Stop reason: HOSPADM

## 2020-12-01 RX ORDER — SODIUM CHLORIDE 0.9 % (FLUSH) 0.9 %
5-40 SYRINGE (ML) INJECTION AS NEEDED
Status: DISCONTINUED | OUTPATIENT
Start: 2020-12-01 | End: 2020-12-08 | Stop reason: HOSPADM

## 2020-12-01 RX ORDER — LISINOPRIL 20 MG/1
20 TABLET ORAL DAILY
Status: DISCONTINUED | OUTPATIENT
Start: 2020-12-02 | End: 2020-12-08 | Stop reason: HOSPADM

## 2020-12-01 RX ORDER — SODIUM CHLORIDE 9 MG/ML
75 INJECTION, SOLUTION INTRAVENOUS CONTINUOUS
Status: DISCONTINUED | OUTPATIENT
Start: 2020-12-01 | End: 2020-12-05

## 2020-12-01 RX ORDER — ONDANSETRON 2 MG/ML
4 INJECTION INTRAMUSCULAR; INTRAVENOUS
Status: DISCONTINUED | OUTPATIENT
Start: 2020-12-01 | End: 2020-12-08 | Stop reason: HOSPADM

## 2020-12-01 RX ORDER — POLYETHYLENE GLYCOL 3350 17 G/17G
17 POWDER, FOR SOLUTION ORAL DAILY PRN
Status: DISCONTINUED | OUTPATIENT
Start: 2020-12-01 | End: 2020-12-08 | Stop reason: HOSPADM

## 2020-12-01 RX ORDER — SODIUM CHLORIDE 0.9 % (FLUSH) 0.9 %
5-40 SYRINGE (ML) INJECTION EVERY 8 HOURS
Status: DISCONTINUED | OUTPATIENT
Start: 2020-12-01 | End: 2020-12-08 | Stop reason: HOSPADM

## 2020-12-01 RX ORDER — ACETAMINOPHEN 650 MG/1
650 SUPPOSITORY RECTAL
Status: DISCONTINUED | OUTPATIENT
Start: 2020-12-01 | End: 2020-12-08 | Stop reason: HOSPADM

## 2020-12-01 RX ORDER — MONTELUKAST SODIUM 10 MG/1
10 TABLET ORAL
Status: DISCONTINUED | OUTPATIENT
Start: 2020-12-01 | End: 2020-12-08 | Stop reason: HOSPADM

## 2020-12-01 RX ORDER — ESCITALOPRAM OXALATE 10 MG/1
10 TABLET ORAL DAILY
Status: DISCONTINUED | OUTPATIENT
Start: 2020-12-02 | End: 2020-12-01 | Stop reason: SDUPTHER

## 2020-12-01 RX ORDER — PANTOPRAZOLE SODIUM 40 MG/1
40 TABLET, DELAYED RELEASE ORAL
Status: DISCONTINUED | OUTPATIENT
Start: 2020-12-02 | End: 2020-12-08 | Stop reason: HOSPADM

## 2020-12-01 RX ORDER — ACETAMINOPHEN 325 MG/1
650 TABLET ORAL
Status: DISCONTINUED | OUTPATIENT
Start: 2020-12-01 | End: 2020-12-08 | Stop reason: HOSPADM

## 2020-12-01 RX ORDER — FLUTICASONE PROPIONATE 50 MCG
2 SPRAY, SUSPENSION (ML) NASAL DAILY
Status: DISCONTINUED | OUTPATIENT
Start: 2020-12-02 | End: 2020-12-08 | Stop reason: HOSPADM

## 2020-12-01 RX ORDER — POTASSIUM CHLORIDE 20 MEQ/1
40 TABLET, EXTENDED RELEASE ORAL DAILY
Status: DISCONTINUED | OUTPATIENT
Start: 2020-12-02 | End: 2020-12-03

## 2020-12-01 RX ORDER — ARFORMOTEROL TARTRATE 15 UG/2ML
15 SOLUTION RESPIRATORY (INHALATION)
Status: DISCONTINUED | OUTPATIENT
Start: 2020-12-01 | End: 2020-12-08 | Stop reason: HOSPADM

## 2020-12-01 RX ADMIN — ACETAMINOPHEN 650 MG: 325 TABLET, FILM COATED ORAL at 20:26

## 2020-12-01 RX ADMIN — Medication 10 ML: at 22:50

## 2020-12-01 RX ADMIN — BUDESONIDE 500 MCG: 0.5 INHALANT RESPIRATORY (INHALATION) at 22:50

## 2020-12-01 RX ADMIN — DONEPEZIL HYDROCHLORIDE 5 MG: 5 TABLET, FILM COATED ORAL at 22:49

## 2020-12-01 RX ADMIN — SODIUM CHLORIDE 1000 ML: 900 INJECTION, SOLUTION INTRAVENOUS at 14:41

## 2020-12-01 RX ADMIN — ARFORMOTEROL TARTRATE 15 MCG: 15 SOLUTION RESPIRATORY (INHALATION) at 22:50

## 2020-12-01 RX ADMIN — MONTELUKAST 10 MG: 10 TABLET, FILM COATED ORAL at 22:49

## 2020-12-01 RX ADMIN — POTASSIUM CHLORIDE 40 MEQ: 1500 TABLET, EXTENDED RELEASE ORAL at 20:26

## 2020-12-01 RX ADMIN — Medication 10 ML: at 20:26

## 2020-12-01 RX ADMIN — SODIUM CHLORIDE 150 ML/HR: 900 INJECTION, SOLUTION INTRAVENOUS at 20:19

## 2020-12-01 NOTE — ED TRIAGE NOTES
Vomiting started yesterday   Patient states she feels fine and has not vomited today. Patient is confused. Charlene Colmenares is at the bedside and states patient only vomited today, but has been on a Ellice over the course of several weeks. States before covid hit patient  was very active.

## 2020-12-01 NOTE — PROGRESS NOTES
Verbal  report given by Danica Schaefer RN ** (offgoing nurse). Report included the following information SBAR, Kardex, ED Summary, Intake/Output, MAR, Recent Results, Cardiac Rhythm SB and Quality Measures. rec'd patient 200. Transferred to bed made comfortable. Incontinent of urine , removed soiled brief foul strong odor   Attempted to call patient home  left voicemail for Steph Angle to call facility patient unable to answer admission assessment questions . Made comfortable reoriented to room and staff . Bed alarm on . Bedside and Verbal shift change report given to Anatoliy HUIZAR  (oncoming nurse) by Lul Trinh RN * (offgoing nurse). Report included the following information SBAR, Kardex, ED Summary, MAR, Recent Results, Cardiac Rhythm SB and Quality Measures.

## 2020-12-01 NOTE — ED NOTES
Report of patient given to Ron Madrid RN  for continued care.  Relinquished care of patient at this time

## 2020-12-01 NOTE — ED PROVIDER NOTES
The patient is a 68-year-old woman with past medical history significant for COPD, who is currently on 6 L of home O2, she also has a history of lung cancer, was brought to the ED today by her medical POA, Nanci Hightower, because the patient has been vomiting since last evening. Patient has mild dementia and relied heavily on her POA to relay most of her history. Her POA states that she has been effectively locked in her room at assisted living since Covid started. Over the past several months the patient has had a decreased appetite, wants to lay in bed all day, and has gone from being fairly active to being fairly frail. She has not been evaluated by a physician for the symptoms and did not have her cancer follow-up in April 2020 because of Covid. Her POA states that she does have a history of depression in the past as well as mild dementia, but the patient herself denies that she is depressed at all.            Past Medical History:   Diagnosis Date    Anxiety associated with depression     Stone's esophagus     CAD (coronary artery disease)     Carotid artery disease (HCC)     COPD (chronic obstructive pulmonary disease) (HCC)     CVD (cardiovascular disease)     DJD (degenerative joint disease)     Emphysema of lung (HCC)     GERD (gastroesophageal reflux disease)     History of cervical cancer     HLD (hyperlipidemia)     Hypercholesteremia     Hypertension     Lung cancer (HCC)     [id number], s/p definitive radiation    Thyroid disease     Tobacco abuse        Past Surgical History:   Procedure Laterality Date    COLONOSCOPY N/A 2/28/2018    COLONOSCOPY performed by Chacorta Siddiqui MD at [facility] ENDOSCOPY    HX CATARACT REMOVAL      HX OVARIAN CYST REMOVAL      NEUROLOGICAL PROCEDURE UNLISTED      cervical discetomy and fusion    VASCULAR SURGERY PROCEDURE UNLIST      carotid artery         Family History:   Problem Relation Age of Onset    Cancer Father     Ovarian Cancer Maternal Aunt        Social History     Socioeconomic History    Marital status: SINGLE     Spouse name: Not on file    Number of children: Not on file    Years of education: Not on file    Highest education level: Not on file   Occupational History    Not on file   Social Needs    Financial resource strain: Not on file    Food insecurity     Worry: Not on file     Inability: Not on file    Transportation needs     Medical: Not on file     Non-medical: Not on file   Tobacco Use    Smoking status: Former Smoker     Last attempt to quit: 2016     Years since quittin.7    Smokeless tobacco: Never Used   Substance and Sexual Activity    Alcohol use: No    Drug use: No    Sexual activity: Not on file   Lifestyle    Physical activity     Days per week: Not on file     Minutes per session: Not on file    Stress: Not on file   Relationships    Social connections     Talks on phone: Not on file     Gets together: Not on file     Attends Worship service: Not on file     Active member of club or organization: Not on file     Attends meetings of clubs or organizations: Not on file     Relationship status: Not on file    Intimate partner violence     Fear of current or ex partner: Not on file     Emotionally abused: Not on file     Physically abused: Not on file     Forced sexual activity: Not on file   Other Topics Concern    Not on file   Social History Narrative    Not on file         ALLERGIES: Levofloxacin; Penicillins; and Sulfamethoxazole-trimethoprim    Review of Systems   Constitutional: Positive for activity change and appetite change. Negative for chills, diaphoresis, fatigue, fever and unexpected weight change. HENT: Negative. Eyes: Negative. Respiratory: Negative. Cardiovascular: Negative. Gastrointestinal: Negative. Endocrine: Negative. Genitourinary: Negative. Musculoskeletal: Negative. Skin: Negative. Allergic/Immunologic: Negative. Neurological: Negative. Hematological: Negative. Psychiatric/Behavioral: Negative. Vitals:    12/01/20 1236 12/01/20 1318   BP: 129/85 126/81   Resp: 18    Temp: 97.6 °F (36.4 °C)    SpO2: 100% 100%            Physical Exam  Vitals signs and nursing note reviewed. Constitutional:       Comments: Somewhat disheveled and frail appearing   HENT:      Head: Normocephalic and atraumatic. Nose: Nose normal.      Mouth/Throat:      Mouth: Mucous membranes are moist.   Eyes:      Extraocular Movements: Extraocular movements intact. Conjunctiva/sclera: Conjunctivae normal.      Pupils: Pupils are equal, round, and reactive to light. Neck:      Musculoskeletal: Normal range of motion and neck supple. Cardiovascular:      Rate and Rhythm: Normal rate and regular rhythm. Pulses: Normal pulses. Heart sounds: Normal heart sounds. Pulmonary:      Effort: Pulmonary effort is normal.      Breath sounds: Normal breath sounds. Abdominal:      General: Abdomen is flat. Bowel sounds are normal.      Palpations: Abdomen is soft. Musculoskeletal: Normal range of motion. Skin:     General: Skin is warm and dry. Capillary Refill: Capillary refill takes less than 2 seconds. Neurological:      General: No focal deficit present. Mental Status: She is alert. Mental status is at baseline. Psychiatric:         Mood and Affect: Mood normal.         Behavior: Behavior normal.         Thought Content:  Thought content normal.         Judgment: Judgment normal.        Recent Results (from the past 12 hour(s))   CBC WITH AUTOMATED DIFF    Collection Time: 12/01/20  1:20 PM   Result Value Ref Range    WBC 11.3 4.6 - 13.2 K/uL    RBC 4.75 4.20 - 5.30 M/uL    HGB 12.3 12.0 - 16.0 g/dL    HCT 40.6 35.0 - 45.0 %    MCV 85.5 74.0 - 97.0 FL    MCH 25.9 24.0 - 34.0 PG    MCHC 30.3 (L) 31.0 - 37.0 g/dL    RDW 16.7 (H) 11.6 - 14.5 %    PLATELET 782 178 - 921 K/uL    MPV 9.4 9.2 - 11.8 FL    NEUTROPHILS 91 (H) 40 - 73 % LYMPHOCYTES 8 (L) 21 - 52 %    MONOCYTES 1 (L) 3 - 10 %    EOSINOPHILS 0 0 - 5 %    BASOPHILS 0 0 - 2 %    ABS. NEUTROPHILS 10.2 (H) 1.8 - 8.0 K/UL    ABS. LYMPHOCYTES 0.9 0.9 - 3.6 K/UL    ABS. MONOCYTES 0.2 0.05 - 1.2 K/UL    ABS. EOSINOPHILS 0.0 0.0 - 0.4 K/UL    ABS. BASOPHILS 0.0 0.0 - 0.1 K/UL    DF AUTOMATED     METABOLIC PANEL, COMPREHENSIVE    Collection Time: 12/01/20  1:20 PM   Result Value Ref Range    Sodium 131 (L) 136 - 145 mmol/L    Potassium 3.1 (L) 3.5 - 5.5 mmol/L    Chloride 91 (L) 100 - 111 mmol/L    CO2 32 21 - 32 mmol/L    Anion gap 8 3.0 - 18 mmol/L    Glucose 91 74 - 99 mg/dL    BUN 16 7.0 - 18 MG/DL    Creatinine 1.18 0.6 - 1.3 MG/DL    BUN/Creatinine ratio 14 12 - 20      GFR est AA 54 (L) >60 ml/min/1.73m2    GFR est non-AA 44 (L) >60 ml/min/1.73m2    Calcium 13.8 (HH) 8.5 - 10.1 MG/DL    Bilirubin, total 0.4 0.2 - 1.0 MG/DL    ALT (SGPT) 8 (L) 13 - 56 U/L    AST (SGOT) 10 10 - 38 U/L    Alk. phosphatase 113 45 - 117 U/L    Protein, total 7.7 6.4 - 8.2 g/dL    Albumin 2.7 (L) 3.4 - 5.0 g/dL    Globulin 5.0 (H) 2.0 - 4.0 g/dL    A-G Ratio 0.5 (L) 0.8 - 1.7     NT-PRO BNP    Collection Time: 12/01/20  1:20 PM   Result Value Ref Range    NT pro- 0 - 1,800 PG/ML   TROPONIN I    Collection Time: 12/01/20  1:20 PM   Result Value Ref Range    Troponin-I, QT <0.02 0.0 - 0.045 NG/ML   MAGNESIUM    Collection Time: 12/01/20  1:20 PM   Result Value Ref Range    Magnesium 1.8 1.6 - 2.6 mg/dL   TSH 3RD GENERATION    Collection Time: 12/01/20  1:20 PM   Result Value Ref Range    TSH 72.80 (H) 0.36 - 3.74 uIU/mL   SARS-COV-2    Collection Time: 12/01/20  3:46 PM   Result Value Ref Range    SARS-CoV-2 PENDING     Specimen source Nasopharyngeal      COVID-19 rapid test Not detected NOTD      Specimen type NP Swab      Health status NP Swab      COVID-19 PENDING        EKG: Sinus rhythm with sinus arrhythmia, with occasional PVCs.   Normal axis, QTC of 503, but otherwise normal intervals, ST and T wave changes throughout. CT CHEST WO CONT   Final Result   IMPRESSION:      Right lower lobar atelectasis with likely soft tissue mass replacing the normal   lung parenchyma with areas of necrosis as above. Associated attenuation of the   bronchus intermedius. Left adrenal 2.5 cm soft tissue nodule. Left posterior thorax 7.8 x 4.2 cm subcutaneous/intramuscular soft tissue   lesion. Mediastinal adenopathy, reactive versus metastatic disease. XR CHEST PORT   Final Result   Impression:      No acute cardiopulmonary disease. Nonspecific medial right lung base opacity,   elevated diaphragm or possible hernia and less likely mass. MDM  Number of Diagnoses or Management Options  Hypercalcemia:   Hypothyroidism (acquired): Malignant neoplasm of lower lobe of right lung [facility]):   Diagnosis management comments: The patient is a 24-year-old woman with past medical history significant for COPD, who also has lung cancer, who had missed her cancer follow-up appointment in April due to the Covid epidemic. She was brought to the ED because she has been vomiting since last night, but her POA states that she has significantly changed since the Covid outbreak started and she has not been able to socialize or go about her regular activities. The patient is noted to have hypercalcemia of 13.8 as well as a TSH of 72.8. She had thyroid ablation due to thyroid cancer in the past.  It appears that she is not taking her Synthroid or needs to have her dose adjusted. Her hypercalcemia is most likely secondary to lung cancer. She had not followed up with her oncologist for repeat chest x-ray after lung cancer treatment. Her CT does show a right lower lobe soft tissue mass and suspicious areas in the lymph nodes and soft tissue. The patient has received IV fluids in the ED. I am consulting the hospitalist for admission for further evaluation and management.   He has accepted the patient on their service.          Procedures

## 2020-12-01 NOTE — ED NOTES
TRANSFER - ED to INPATIENT REPORT:    Verbal report given to Kelsi(name) on [address]  being transferred to 2200(unit) for routine progression of care       Report consisted of patients Situation, Background, Assessment and   Recommendations(SBAR). SBAR report made available to receiving floor on this patient being transferred to [address] [place] [address] (2200)  for routine progression of care       Admitting diagnosis Hypercalcemia [E83.52]    Information from the following report(s) SBAR was made available to receiving floor. Lines:   Peripheral IV 12/01/20 Right Forearm (Active)   Site Assessment Clean, dry, & intact 12/01/20 1323   Phlebitis Assessment 0 12/01/20 1323   Infiltration Assessment 0 12/01/20 1323   Dressing Status Clean, dry, & intact 12/01/20 1323   Hub Color/Line Status Blue 12/01/20 1323            Medication list updated today    Opportunity for questions and clarification was provided.       Patient is only aware of  person    Patient is  incontinent and ambulatory with assist     Valuables transported with patient     Patient transported with:   Monitor  O2 @ 2 liters    MAP (Monitor): 97 =Monitored (most recent)  Vitals w/ MEWS Score (last day)     Date/Time MEWS Score Pulse Resp Temp BP Level of Consciousness SpO2    12/01/20 1721          (!) 147/81    100 %    12/01/20 15:07:39    66              12/01/20 1445          (!) 123/53    100 %    12/01/20 1318          126/81    100 %    12/01/20 1236      18  97.6 °F (36.4 °C)  129/85  Alert  100 %

## 2020-12-01 NOTE — H&P
Internal Medicine History and Physical  Note           NAME:  Macie Chua   :   1945   MRN:  [id number]     PCP:  Sajan Albrecht MD     Date/Time:  2020 5:40 PM      I hereby certify this patient for admission based upon medical necessity as noted below:        Assessment / plan :        #  Hypercalcemia (2020). IVF  Hydration. Tele bed. Follow labs. No Ca supplements for now     #   COPD (chronic obstructive pulmonary disease) ([facility] (4/10/2019). Prn nebs    #  Hypokalemia (4/10/2019). Replete. Trend. Mg level    #  Possible  Metabolic encephalopathy due to Metabolic derangement . Ho dementia on aricept. Unclear base line mentation level      #  Hyponatremia (2020). IVF. NS. Osm. Monitor UOP. Serial labs. Possible SIADH Due to lung lesion , clinically patient look dry . Urine Na and Osm       # ho   Lung cancer . Squamous cell lung cancer on XRT. Records from oncology clinic     # Chronic respiratory failure ([facility] (2020) on home o2. Maintain o2    #  HTN (hypertension) (2020). Control BP     # Acquired hypothyroidism (2020) on Synthroid. TSH 72 in ER on presentation . Unclear if she is taking her meds regularly. Resume her home meds  FT4 level added. # ho  Dementia ([facility] (2020)     # Continue home regimen / Medications when appropriate. -DVT prophylaxis :  heparin.   - Code Status : FULL    -Other chronic medical problems as per past history. Further management depend on the course of the case and expanded data base. DISPO - pt to be admitted at this time for reasons addressed above, continued hospitalization for ongoing assessment and treatment indicated        Subjective:     CHIEF COMPLAINT: vomiting overnight      HISTORY OF PRESENT ILLNESS:     Ms. Sylvester Morocho is a 66 y.o.  female who is admitted for hypercalcemia.   Ms. Sylvester Morocho with past medical history as noted below , presented to the Emergency Department today  complaining of above. Triage and ER notes noted. ER MD wanted to admit the patient to the hospital for further management and called hospitalist to facilitate this process. From assisted living, vomited overnight. Care giver or POA stated to ER staff that from the time COVID started and facility lock down patient been deteriorating steadily. Patient is very poor historian , stating she is at [facility], she knew she is in a hospital otherwise she has no clue of what going on or can provide other information.      Past Medical History:   Diagnosis Date    Anxiety associated with depression     Stone's esophagus     CAD (coronary artery disease)     Carotid artery disease (HCC)     COPD (chronic obstructive pulmonary disease) (HCC)     CVD (cardiovascular disease)     DJD (degenerative joint disease)     Emphysema of lung (HCC)     GERD (gastroesophageal reflux disease)     History of cervical cancer     HLD (hyperlipidemia)     Hypercholesteremia     Hypertension     Lung cancer (HCC)     [id number], s/p definitive radiation    Thyroid disease     Tobacco abuse         Past Surgical History:   Procedure Laterality Date    COLONOSCOPY N/A 2018    COLONOSCOPY performed by Ryan Mckeon MD at [address] [place] [address] Drive ENDOSCOPY    HX CATARACT REMOVAL      HX OVARIAN CYST REMOVAL      NEUROLOGICAL PROCEDURE UNLISTED      cervical discetomy and fusion    VASCULAR SURGERY PROCEDURE UNLIST      carotid artery       Social History     Tobacco Use    Smoking status: Former Smoker     Last attempt to quit: 2016     Years since quittin.7    Smokeless tobacco: Never Used   Substance Use Topics    Alcohol use: No        Family History   Problem Relation Age of Onset    Cancer Father     Ovarian Cancer Maternal Aunt         Allergies   Allergen Reactions    Levofloxacin Swelling    Penicillins Itching     Allergy is questionable    Pt verbalized \"i had rash from this 30 years ago\"  Sulfamethoxazole-Trimethoprim Hives        Prior to Admission medications    Medication Sig Start Date End Date Taking? Authorizing Provider   potassium chloride SR (K-TAB) 20 mEq tablet Take 2 Tabs by mouth daily. 11/14/20   Frosty Sers, DO   acetaminophen (TYLENOL) 325 mg tablet Take 2 Tabs by mouth every four (4) hours as needed for Pain. 11/13/20   Frosty Sers, DO   lidocaine (Lidoderm) 5 % Apply patch to the affected area for 12 hours a day and remove for 12 hours a day. 11/13/20   Frosty Sers, DO   clonazePAM (KLONOPIN) 0.125 mg disintegrating tablet place 1 tablet by mouth once daily as needed for anxiety 4/16/19   Keith Manner, MD   albuterol (PROVENTIL VENTOLIN) 2.5 mg /3 mL (0.083 %) nebulizer solution 3 mL by Nebulization route every six (6) hours as needed for Wheezing. 4/16/19   Keith Manner, MD   levomefolate calcium (DEPLIN PO) Take 15 mg by mouth daily. Provider, Historical   escitalopram oxalate (LEXAPRO) 10 mg tablet Take 10 mg by mouth daily. Provider, Historical   citalopram (CELEXA) 20 mg tablet Take 20 mg by mouth daily. 2/10/16   Provider, Historical   donepezil (ARICEPT) 10 mg tablet 1 tablet daily 11/8/18   Provider, Historical   ergocalciferol (ERGOCALCIFEROL) 50,000 unit capsule Take 1 Cap by Mouth Every Monday. 12/6/15   Provider, Historical   fluticasone (FLONASE) 50 mcg/actuation nasal spray 2 Sprays daily as needed. Provider, Historical   montelukast (SINGULAIR) 10 mg tablet Take 1 Tab by Mouth Daily as needed. 11/21/15   Provider, Historical   SYMBICORT 160-4.5 mcg/actuation HFAA  12/14/17   Provider, Historical   lamoTRIgine (LAMICTAL) 100 mg tablet 1 tab at bedtime 12/26/17   Provider, Historical   levothyroxine (SYNTHROID) 88 mcg tablet Take 100 mcg by mouth Daily (before breakfast). Indications: hypothyroidism    Provider, Historical   lisinopril (PRINIVIL, ZESTRIL) 40 mg tablet Take 20 mg by mouth daily.     Provider, Historical   aspirin 81 mg chewable tablet Take 81 mg by mouth daily. Provider, Historical   amLODIPine (NORVASC) 2.5 mg tablet Take 10 mg by mouth daily. Provider, Historical   Cholecalciferol, Vitamin D3, 50,000 unit cap Take 1 Cap by mouth every seven (7) days. Provider, Historical   Omeprazole delayed release (PRILOSEC D/R) 20 mg tablet Take 40 mg by mouth daily. Provider, Historical   atorvastatin (LIPITOR) 40 mg tablet Take 40 mg by mouth daily. Provider, Historical       Review of Systems:  Poor historian due to mental state       VITALS:    Vital signs reviewed; most recent are:    Visit Vitals  BP (!) 123/53   Pulse 66   Temp 97.6 °F (36.4 °C)   Resp 18   SpO2 100%     SpO2 Readings from Last 6 Encounters:   12/01/20 100%   11/18/20 95%   11/13/20 100%   09/17/19 98%   04/16/19 95%   02/28/18 97%            Intake/Output Summary (Last 24 hours) at 12/1/2020 1740  Last data filed at 12/1/2020 1555  Gross per 24 hour   Intake 1000 ml   Output    Net 1000 ml        Physical Exam:     Gen:  Appear stated age, Well-developed,   in no acute distress  HEENT: upper and lower dentures,  Head atraumatic, normocephalic , hearing intact to voice, DRY  mucous membranes. Neck:  Trachea midline , No apparent JVD, Supple,  thyroid non-tender  Resp:    No accessory muscle use,Bilateral BS present, clear breath sounds without wheezes rales or rhonchi  Card:   normal S1, S2 without Gallop . No Significant lower leg peripheral edema. Abd:  Soft, non-tender, non-distended, bowel sounds are present    Musc: hard bony mass L upper back at L scapula region about 5 cm,  No cyanosis or clubbing. Skin: Warm and dry . No rashes or ulcers   Neuro:   No  facial asymmetry,  no clear area of focal motor weakness, unable to follows commands appropriately  . alert. Labs:     All Cardiac Markers in the last 24 hours:   Lab Results   Component Value Date/Time    TROIQ <0.02 12/01/2020 01:20 PM       Recent Labs     12/01/20  1320   WBC 11.3   HGB 12.3 HCT 40.6        Recent Labs     12/01/20  1320   *   K 3.1*   CL 91*   CO2 32   GLU 91   BUN 16   CREA 1.18   CA 13.8*   MG 1.8   ALB 2.7*   TBILI 0.4   ALT 8*     Lab Results   Component Value Date/Time    Glucose (POC) 174 (H) 04/14/2019 09:42 PM    Glucose, POC 92 02/28/2018 10:49 AM     No results for input(s): PH, PCO2, PO2, HCO3, FIO2 in the last 72 hours. No results for input(s): INR, INREXT in the last 72 hours. Ct Chest Wo Cont    Result Date: 12/1/2020  IMPRESSION: Right lower lobar atelectasis with likely soft tissue mass replacing the normal lung parenchyma with areas of necrosis as above. Associated attenuation of the bronchus intermedius. Left adrenal 2.5 cm soft tissue nodule. Left posterior thorax 7.8 x 4.2 cm subcutaneous/intramuscular soft tissue lesion. Mediastinal adenopathy, reactive versus metastatic disease. Xr Chest Port    Result Date: 12/1/2020  Impression: No acute cardiopulmonary disease. Nonspecific medial right lung base opacity, elevated diaphragm or possible hernia and less likely mass. Please refer to radiology reports. Risk of deterioration: high      Total time spent in the care of this patient: [address] discussed with: Nursing Staff, >50% of time spent in counseling and coordination of care and ER MD      Discussed:  Care Plan       I have personally reviewed all pertinent labs, films and EKGs that have officially resulted. I reviewed available pertaining electronic documentation outlining the initial presentation as well as the emergency room physician's encounter. This document in whole or part of it has been produced using voice recognition software. Unrecognized errors in transcription may be present.     Attending Physician: Amber Escamilla MD

## 2020-12-02 ENCOUNTER — APPOINTMENT (OUTPATIENT)
Dept: GENERAL RADIOLOGY | Age: 75
DRG: 640 | End: 2020-12-02
Attending: INTERNAL MEDICINE
Payer: MEDICARE

## 2020-12-02 LAB
ALBUMIN SERPL-MCNC: 2.4 G/DL (ref 3.4–5)
ALBUMIN/GLOB SERPL: 0.6 {RATIO} (ref 0.8–1.7)
ALP SERPL-CCNC: 98 U/L (ref 45–117)
ALT SERPL-CCNC: 7 U/L (ref 13–56)
ANION GAP SERPL CALC-SCNC: 7 MMOL/L (ref 3–18)
AST SERPL-CCNC: 13 U/L (ref 10–38)
ATRIAL RATE: 66 BPM
BASOPHILS # BLD: 0 K/UL (ref 0–0.1)
BASOPHILS NFR BLD: 0 % (ref 0–2)
BILIRUB DIRECT SERPL-MCNC: 0.1 MG/DL (ref 0–0.2)
BILIRUB SERPL-MCNC: 0.3 MG/DL (ref 0.2–1)
BUN SERPL-MCNC: 15 MG/DL (ref 7–18)
BUN/CREAT SERPL: 15 (ref 12–20)
CALCIUM SERPL-MCNC: 11.8 MG/DL (ref 8.5–10.1)
CALCULATED P AXIS, ECG09: 70 DEGREES
CALCULATED R AXIS, ECG10: 43 DEGREES
CALCULATED T AXIS, ECG11: 88 DEGREES
CHLORIDE SERPL-SCNC: 99 MMOL/L (ref 100–111)
CO2 SERPL-SCNC: 30 MMOL/L (ref 21–32)
COVID-19 RAPID TEST, COVR: NOT DETECTED
CREAT SERPL-MCNC: 0.97 MG/DL (ref 0.6–1.3)
DIAGNOSIS, 93000: NORMAL
DIFFERENTIAL METHOD BLD: ABNORMAL
EOSINOPHIL # BLD: 0.2 K/UL (ref 0–0.4)
EOSINOPHIL NFR BLD: 2 % (ref 0–5)
ERYTHROCYTE [DISTWIDTH] IN BLOOD BY AUTOMATED COUNT: 16.9 % (ref 11.6–14.5)
GLOBULIN SER CALC-MCNC: 3.7 G/DL (ref 2–4)
GLUCOSE SERPL-MCNC: 57 MG/DL (ref 74–99)
HCT VFR BLD AUTO: 34.6 % (ref 35–45)
HEALTH STATUS, XMCV2T: NORMAL
HGB BLD-MCNC: 10.3 G/DL (ref 12–16)
INR PPP: 1.1 (ref 0.8–1.2)
LYMPHOCYTES # BLD: 0.9 K/UL (ref 0.9–3.6)
LYMPHOCYTES NFR BLD: 9 % (ref 21–52)
MAGNESIUM SERPL-MCNC: 1.7 MG/DL (ref 1.6–2.6)
MCH RBC QN AUTO: 25.4 PG (ref 24–34)
MCHC RBC AUTO-ENTMCNC: 29.8 G/DL (ref 31–37)
MCV RBC AUTO: 85.4 FL (ref 74–97)
MONOCYTES # BLD: 1 K/UL (ref 0.05–1.2)
MONOCYTES NFR BLD: 11 % (ref 3–10)
NEUTS SEG # BLD: 7.1 K/UL (ref 1.8–8)
NEUTS SEG NFR BLD: 78 % (ref 40–73)
OSMOLALITY UR: 342 MOSM/KG H2O (ref 50–1400)
P-R INTERVAL, ECG05: 168 MS
PHOSPHATE SERPL-MCNC: 2.1 MG/DL (ref 2.5–4.9)
PLATELET # BLD AUTO: 277 K/UL (ref 135–420)
PMV BLD AUTO: 10.3 FL (ref 9.2–11.8)
POTASSIUM SERPL-SCNC: 3.5 MMOL/L (ref 3.5–5.5)
PROT SERPL-MCNC: 6.1 G/DL (ref 6.4–8.2)
PROTHROMBIN TIME: 13.8 SEC (ref 11.5–15.2)
Q-T INTERVAL, ECG07: 480 MS
QRS DURATION, ECG06: 96 MS
QTC CALCULATION (BEZET), ECG08: 503 MS
RBC # BLD AUTO: 4.05 M/UL (ref 4.2–5.3)
SODIUM SERPL-SCNC: 136 MMOL/L (ref 136–145)
SODIUM UR-SCNC: 92 MMOL/L (ref 20–110)
SOURCE, COVRS: NORMAL
SPECIMEN TYPE, XMCV1T: NORMAL
T4 FREE SERPL-MCNC: 0.3 NG/DL (ref 0.7–1.5)
TSH SERPL DL<=0.05 MIU/L-ACNC: 76 UIU/ML (ref 0.36–3.74)
VENTRICULAR RATE, ECG03: 66 BPM
WBC # BLD AUTO: 9.1 K/UL (ref 4.6–13.2)

## 2020-12-02 PROCEDURE — 65660000000 HC RM CCU STEPDOWN

## 2020-12-02 PROCEDURE — 85025 COMPLETE CBC W/AUTO DIFF WBC: CPT

## 2020-12-02 PROCEDURE — 2709999900 HC NON-CHARGEABLE SUPPLY

## 2020-12-02 PROCEDURE — 97166 OT EVAL MOD COMPLEX 45 MIN: CPT

## 2020-12-02 PROCEDURE — 84100 ASSAY OF PHOSPHORUS: CPT

## 2020-12-02 PROCEDURE — 36415 COLL VENOUS BLD VENIPUNCTURE: CPT

## 2020-12-02 PROCEDURE — 74011000250 HC RX REV CODE- 250: Performed by: INTERNAL MEDICINE

## 2020-12-02 PROCEDURE — 77010033678 HC OXYGEN DAILY

## 2020-12-02 PROCEDURE — 80048 BASIC METABOLIC PNL TOTAL CA: CPT

## 2020-12-02 PROCEDURE — 94761 N-INVAS EAR/PLS OXIMETRY MLT: CPT

## 2020-12-02 PROCEDURE — 74011250637 HC RX REV CODE- 250/637: Performed by: INTERNAL MEDICINE

## 2020-12-02 PROCEDURE — 74011250636 HC RX REV CODE- 250/636: Performed by: INTERNAL MEDICINE

## 2020-12-02 PROCEDURE — 85610 PROTHROMBIN TIME: CPT

## 2020-12-02 PROCEDURE — 94664 DEMO&/EVAL PT USE INHALER: CPT

## 2020-12-02 PROCEDURE — 83735 ASSAY OF MAGNESIUM: CPT

## 2020-12-02 PROCEDURE — 94640 AIRWAY INHALATION TREATMENT: CPT

## 2020-12-02 PROCEDURE — 77030038269 HC DRN EXT URIN PURWCK BARD -A

## 2020-12-02 PROCEDURE — 84439 ASSAY OF FREE THYROXINE: CPT

## 2020-12-02 PROCEDURE — 77030021352 HC CBL LD SYS DISP COVD -B

## 2020-12-02 PROCEDURE — 84443 ASSAY THYROID STIM HORMONE: CPT

## 2020-12-02 PROCEDURE — 80076 HEPATIC FUNCTION PANEL: CPT

## 2020-12-02 PROCEDURE — 73030 X-RAY EXAM OF SHOULDER: CPT

## 2020-12-02 RX ORDER — LEVOTHYROXINE SODIUM 75 UG/1
150 TABLET ORAL
Status: DISCONTINUED | OUTPATIENT
Start: 2020-12-03 | End: 2020-12-08 | Stop reason: HOSPADM

## 2020-12-02 RX ADMIN — ASPIRIN 81 MG: 81 TABLET, CHEWABLE ORAL at 09:09

## 2020-12-02 RX ADMIN — LEVOTHYROXINE SODIUM 125 MCG: 0.03 TABLET ORAL at 06:31

## 2020-12-02 RX ADMIN — BUDESONIDE 500 MCG: 0.5 INHALANT RESPIRATORY (INHALATION) at 09:57

## 2020-12-02 RX ADMIN — MONTELUKAST 10 MG: 10 TABLET, FILM COATED ORAL at 22:32

## 2020-12-02 RX ADMIN — BUDESONIDE 500 MCG: 0.5 INHALANT RESPIRATORY (INHALATION) at 20:04

## 2020-12-02 RX ADMIN — Medication 10 ML: at 06:33

## 2020-12-02 RX ADMIN — FOLIC ACID 1 MG: 1 TABLET ORAL at 09:09

## 2020-12-02 RX ADMIN — ACETAMINOPHEN 650 MG: 325 TABLET, FILM COATED ORAL at 16:44

## 2020-12-02 RX ADMIN — DONEPEZIL HYDROCHLORIDE 5 MG: 5 TABLET, FILM COATED ORAL at 22:32

## 2020-12-02 RX ADMIN — ACETAMINOPHEN 650 MG: 325 TABLET, FILM COATED ORAL at 10:04

## 2020-12-02 RX ADMIN — SODIUM CHLORIDE 150 ML/HR: 900 INJECTION, SOLUTION INTRAVENOUS at 10:09

## 2020-12-02 RX ADMIN — ACETAMINOPHEN 650 MG: 325 TABLET, FILM COATED ORAL at 22:34

## 2020-12-02 RX ADMIN — POTASSIUM CHLORIDE 40 MEQ: 1500 TABLET, EXTENDED RELEASE ORAL at 09:09

## 2020-12-02 RX ADMIN — FLUTICASONE PROPIONATE 2 SPRAY: 50 SPRAY, METERED NASAL at 09:57

## 2020-12-02 RX ADMIN — SODIUM CHLORIDE 150 ML/HR: 900 INJECTION, SOLUTION INTRAVENOUS at 03:51

## 2020-12-02 RX ADMIN — AMLODIPINE BESYLATE 10 MG: 10 TABLET ORAL at 09:09

## 2020-12-02 RX ADMIN — ARFORMOTEROL TARTRATE 15 MCG: 15 SOLUTION RESPIRATORY (INHALATION) at 20:04

## 2020-12-02 RX ADMIN — LISINOPRIL 20 MG: 20 TABLET ORAL at 09:10

## 2020-12-02 RX ADMIN — SODIUM CHLORIDE 75 ML/HR: 900 INJECTION, SOLUTION INTRAVENOUS at 22:35

## 2020-12-02 RX ADMIN — HEPARIN SODIUM 5000 UNITS: 5000 INJECTION INTRAVENOUS; SUBCUTANEOUS at 12:26

## 2020-12-02 RX ADMIN — ARFORMOTEROL TARTRATE 15 MCG: 15 SOLUTION RESPIRATORY (INHALATION) at 09:57

## 2020-12-02 RX ADMIN — Medication 10 ML: at 14:04

## 2020-12-02 RX ADMIN — LAMOTRIGINE 100 MG: 100 TABLET ORAL at 09:09

## 2020-12-02 RX ADMIN — PANTOPRAZOLE SODIUM 40 MG: 40 TABLET, DELAYED RELEASE ORAL at 09:09

## 2020-12-02 RX ADMIN — CITALOPRAM HYDROBROMIDE 20 MG: 20 TABLET ORAL at 09:09

## 2020-12-02 RX ADMIN — ATORVASTATIN CALCIUM 40 MG: 20 TABLET, FILM COATED ORAL at 09:09

## 2020-12-02 RX ADMIN — Medication 10 ML: at 22:33

## 2020-12-02 NOTE — ROUTINE PROCESS
1945: Assumed care. Quietly resting in bed. Pleasantly confused. Call bell within reach. Bed alarm on & at the lowest level. 2019: Due meds given. IVF of NS at 150 ml/hr started per order. 2026: Medicated for Rt shoulder pain per patient request.  
 
2050: Urine specimen collected & sent down to lab. 2250: RT not available. Due breathing treatment given. 2322: No change from previous assessment. 0200:Sleeping.  
 
0349: No change from previous assessment. 0645: Slept good thru night. Needs attended. Incontinence care provided. 0700: Incontinence care provided again. Purewick placed on.  
 
Layla Mcconnell; Bedside and Verbal shift change report given to Martha Chu (oncoming nurse) by me (offgoing nurse). Report included the following information SBAR, Kardex, Intake/Output, MAR, Recent Results and Cardiac Rhythm SB/SR w/ PAC's.

## 2020-12-02 NOTE — PROGRESS NOTES
Problem: Discharge Planning  Goal: *Discharge to safe environment  Outcome: Progressing Towards Goal     Reason for Admission:   Hypercalcemia                   RUR Score:     25%             PCP: First and Last name:  Aldo Polanco   Name of Practice:    Are you a current patient: Yes/No:    Approximate date of last visit: last Monday   Can you participate in a virtual visit if needed: In person    Do you (patient/family) have any concerns for transition/discharge? Whether needs Hospice and whether can mobilize again              Plan for utilizing home health: uses [facility]      Current Advanced Directive/Advance Care Plan: On file. CM verified pt is DNR that is listed . [address] Jarret Lui            Transition of Care Plan:        Ariana Desai patient's POA via phone. Verified demographics listed on face sheet ; all information correct. Pt lives at [facility] which is [address] in [place]. Facility has lower need Assisted Living, Higher need Assisted Living where they need more therapy and more ADL help, and memory care and Hospice side as well. Patient had been more independent with ADLs prior to admission and has dratiscally declined since COVID. POA stated she will complain with therapy and try and decline but needs firmness or tell pt, \"Ervin said so\" if does decline. Pt has been refusing to get up at group home or shower . DME prior to admission: o2 via NC at 4 liters, nebulizer. Uses rollator but had been just using for carrying oxygen. Discharge plan : discusses SNF and she was open for [facility], but also said considering Hospice as may have Lung Cancer again and decline. Or may just put her on the higher level of care side of assisted living with [facility].  Notified POA that pt refused therapy and she said if she keeps refusing will definitely just go to higher level care side or possibly hospice      Patient has designated ________friend________________ to participate in his/her discharge plan and to receive any needed information. Allegra Johns Friend Primary Decision Maker Yes [phone]      Care Management Interventions  PCP Verified by CM:  Yes  Mode of Transport at Discharge: BLS  Transition of Care Consult (CM Consult): Assisted Living  Current Support Network: Assisted Living  Confirm Follow Up Transport: Other (see comment)  Discharge Location  Discharge Placement: Assisted Living

## 2020-12-02 NOTE — ROUTINE PROCESS
0745  Bedside, Verbal and Written shift change report received from [address] [place] [address]. Report included the following information SBAR, Kardex, Intake/Output, MAR and Recent Results. Bed alarm on for safety. -- AM medications administered, pt tolerated with ease, will continue to monitor. Tylenol given for shoulder pain. 1200 Patient currently resting in bed, alert and oriented to self, denies pain or shortness of breath, call bell in reach, 5 Ps and hourly rounding completed, bed locked in low position. Shift reassessment, pt condition unchanged, will continue to monitor. --  Shift reassessment, pt condition unchanged, will continue to monitor. 1640 Patient screaming in pain when moving. Tylenol given. Dr Natalee Jacob informed via Ayi Laile. Shoulder x ray ordered. 1800 Patient off the floor to Radiology. 1830 Patient back in the room. Purewick on. 
 
1945 Bedside and Verbal shift change report given to Anatoliy HUIZAR (oncoming nurse) by Ramy Chaparro RN (offgoing nurse). Report included the following information SBAR, Kardex, Intake/Output, MAR, Recent Results and Cardiac Rhythm NSR with PAC's.

## 2020-12-02 NOTE — PROGRESS NOTES
Problem: Falls - Risk of  Goal: *Absence of Falls  Description: Document Vicki Hilton Fall Risk and appropriate interventions in the flowsheet. Outcome: Progressing Towards Goal  Note: Fall Risk Interventions:       Mentation Interventions: Door open when patient unattended, Adequate sleep, hydration, pain control, Bed/chair exit alarm, More frequent rounding, Reorient patient    Medication Interventions: Bed/chair exit alarm, Patient to call before getting OOB, Teach patient to arise slowly    Elimination Interventions: Bed/chair exit alarm, Call light in reach, Patient to call for help with toileting needs    History of Falls Interventions: Bed/chair exit alarm, Door open when patient unattended         Problem: Patient Education: Go to Patient Education Activity  Goal: Patient/Family Education  Outcome: Progressing Towards Goal     Problem: Pressure Injury - Risk of  Goal: *Prevention of pressure injury  Description: Document Jose Scale and appropriate interventions in the flowsheet. Outcome: Progressing Towards Goal  Note: Pressure Injury Interventions:  Sensory Interventions: Assess changes in LOC, Discuss PT/OT consult with provider, Keep linens dry and wrinkle-free, Maintain/enhance activity level, Monitor skin under medical devices, Pressure redistribution bed/mattress (bed type), Turn and reposition approx. every two hours (pillows and wedges if needed), Use 30-degree side-lying position    Moisture Interventions: Apply protective barrier, creams and emollients, Absorbent underpads, Internal/External urinary devices, Maintain skin hydration (lotion/cream), Moisture barrier    Activity Interventions: Pressure redistribution bed/mattress(bed type), PT/OT evaluation    Mobility Interventions: HOB 30 degrees or less, Pressure redistribution bed/mattress (bed type), PT/OT evaluation, Turn and reposition approx.  every two hours(pillow and wedges)    Nutrition Interventions: Document food/fluid/supplement intake    Friction and Shear Interventions: HOB 30 degrees or less, Sit at 90-degree angle, Apply protective barrier, creams and emollients

## 2020-12-02 NOTE — PROGRESS NOTES
conducted an initial consultation and Spiritual Assessment for [place], who is a 66 y.o.,female. Patients Primary Language is: [place]. According to the patients EMR Faith Affiliation is: Loewll Wright.     The reason the Patient came to the hospital is:   Patient Active Problem List    Diagnosis Date Noted    Hypercalcemia 36/69/3076    Metabolic encephalopathy 72/63/8748    Hyponatremia 12/01/2020    Lung cancer ([facility] 12/01/2020    Chronic respiratory failure ([facility] 12/01/2020    HTN (hypertension) 12/01/2020    Acquired hypothyroidism 12/01/2020    Dementia ([facility] 12/01/2020    COPD (chronic obstructive pulmonary disease) ([facility] 04/10/2019    Hypokalemia 04/10/2019    Pneumonia 04/10/2019    Encounter for colonoscopy due to history of adenomatous colonic polyps 02/28/2018    Stone's esophagus determined by biopsy 02/28/2018        The  provided the following Interventions:  Initiated a relationship of care and support. Explored issues of radha, spirituality and/or Christian needs while hospitalized. Listened empathically. Provided chaplaincy education. Provided information about Spiritual Care Services. Offered prayer and assurance of continued prayers on patient's behalf. Chart reviewed. The following outcomes were achieved:  Patient shared some information about their medical narrative and spiritual journey/beliefs. Patient processed feeling about current hospitalization. Patient expressed gratitude for the 's visit. Assessment:  Patient did not indicate any spiritual or Christian issues which require Spiritual Care Services interventions at this time. Patient does not have any Christian/cultural needs that will affect patients preferences in health care. Plan:  Chaplains will continue to follow and will provide pastoral care on an as needed or requested basis.    recommends bedside caregivers page  on duty if patient shows signs of acute spiritual or emotional distress.     [address] [facility]   Staff [address] [facility]   [phone]

## 2020-12-02 NOTE — PROGRESS NOTES
Internal Medicine Progress Note        NAME: Marci Leung   :  1945  MRM:  [id number]    Date/Time: 2020        ASSESSMENT/PLAN:     #  Hypercalcemia (2020). Improved with IVF  Hydration. Continue ivf     Follow labs. No Ca supplements for now      #   COPD (chronic obstructive pulmonary disease) ([facility] (4/10/2019). Prn nebs     #  Hypokalemia (4/10/2019). Replete. Trend. Mg level     #  Possible  Metabolic encephalopathy due to Metabolic derangement . Ho dementia on aricept. Unclear base line mentation level       #  Hyponatremia (2020). Improved. IVF. NS. Osm. Monitor UOP. Serial labs. Possible SIADH Due to lung lesion , clinically patient look dry . Urine Na and Osm        # ho   Lung cancer . Squamous cell lung cancer on XRT. Records from oncology clinic      # Chronic respiratory failure ([facility] (2020) on home o2. Maintain o2     #  HTN (hypertension) (2020). Control BP      # Acquired hypothyroidism (2020) on Synthroid. TSH 72 in ER on presentation . Unclear if she is taking her meds regularly. Also been vomiting lately per POA. Possible not taking the medicine correctly ie they give her all meds at same time   Synthroid dose increased slightly   Monitor TSH/FT4       # ho  Dementia ([facility] (2020)     # Continue home regimen / Medications when appropriate.      -DVT prophylaxis :  heparin.   - Code Status : DNR    Talked Trupti Nolan  [phone] . Pt been declining over last 2 weeks. She is not on chemo or radio now. She follows with Dr Hamilton Akers. Clinical condition and possible prognosis dw poa. Questions answered to apparent satisfaction.      Lab Review:     Recent Labs     20  0400 20  1320   WBC 9.1 11.3   HGB 10.3* 12.3   HCT 34.6* 40.6    273     Recent Labs     20  0400 20  1320    131*   K 3.5 3.1*   CL 99* 91*   CO2 30 32   GLU 57* 91   BUN 15 16   CREA 0.97 1.18   CA 11.8* 13.8*   MG 1.7 1.8   PHOS 2.1*  -- ALB 2.4* 2.7*   TBILI 0.3 0.4   ALT 7* 8*   INR 1.1  --      Lab Results   Component Value Date/Time    Glucose (POC) 174 (H) 04/14/2019 09:42 PM    Glucose, POC 92 02/28/2018 10:49 AM     No results for input(s): PH, PCO2, PO2, HCO3, FIO2 in the last 72 hours. Recent Labs     12/02/20  0400   INR 1.1       No results found for: SDES  No results found for: CULT    All Cardiac Markers in the last 24 hours:   Lab Results   Component Value Date/Time    TROIQ <0.02 12/01/2020 01:20 PM           Intervals noted   Pt s/e @ bedside     Subjective:     Chief Complaint:    Poor historian   Offer no complain  Having breathing treatment          Objective:     Vitals:  Last 24hrs VS reviewed since prior progress note. Most recent are:    Visit Vitals  /63   Pulse 73   Temp 97.8 °F (36.6 °C)   Resp 18   Wt 53.1 kg (117 lb 1.6 oz)   SpO2 97%   BMI 23.65 kg/m²     SpO2 Readings from Last 6 Encounters:   12/02/20 97%   11/18/20 95%   11/13/20 100%   09/17/19 98%   04/16/19 95%   02/28/18 97%    O2 Flow Rate (L/min): 2 l/min       Intake/Output Summary (Last 24 hours) at 12/2/2020 1304  Last data filed at 12/2/2020 0930  Gross per 24 hour   Intake 3080 ml   Output    Net 3080 ml          Physical Exam:   Gen:  Appear stated age, Well-developed,   in no acute distress  HEENT: upper and lower dentures,  Head atraumatic, normocephalic , hearing intact to voice, moist  mucous membranes. Neck:   Trachea midline , No apparent JVD, Supple,  thyroid non-tender  Resp:    No accessory muscle use,Bilateral BS present, clear breath sounds without wheezes rales or rhonchi  Card:   normal S1, S2 without Gallop . No Significant lower leg peripheral edema. Abd:  Soft, non-tender, non-distended, bowel sounds are present    Musc: hard bony mass L upper back at L scapula region about 5 cm,  No cyanosis or clubbing. Skin:   Warm and dry .  No rashes or ulcers   Neuro:   No  facial asymmetry,  no clear area of focal motor weakness, unable to follows commands appropriately  . alert.     Medications Reviewed: (see below)    Lab Data Reviewed: (see below)    ______________________________________________________________________    Medications:     Current Facility-Administered Medications   Medication Dose Route Frequency    albuterol-ipratropium (DUO-NEB) 2.5 MG-0.5 MG/3 ML  3 mL Nebulization Q4H PRN    amLODIPine (NORVASC) tablet 10 mg  10 mg Oral DAILY    aspirin chewable tablet 81 mg  81 mg Oral DAILY    atorvastatin (LIPITOR) tablet 40 mg  40 mg Oral DAILY    [START ON 12/7/2020] ergocalciferol capsule 50,000 Units  50,000 Units Oral every Monday    citalopram (CELEXA) tablet 20 mg  20 mg Oral DAILY    donepeziL (ARICEPT) tablet 5 mg  5 mg Oral QHS    fluticasone propionate (FLONASE) 50 mcg/actuation nasal spray 2 Spray  2 Spray Both Nostrils DAILY    lamoTRIgine (LaMICtal) tablet 100 mg  100 mg Oral DAILY    folic acid (FOLVITE) tablet 1 mg  1 mg Oral DAILY    levothyroxine (SYNTHROID) tablet 125 mcg  125 mcg Oral ACB    lisinopriL (PRINIVIL, ZESTRIL) tablet 20 mg  20 mg Oral DAILY    montelukast (SINGULAIR) tablet 10 mg  10 mg Oral QHS    pantoprazole (PROTONIX) tablet 40 mg  40 mg Oral ACB    potassium chloride (K-DUR, KLOR-CON) SR tablet 40 mEq  40 mEq Oral DAILY    0.9% sodium chloride infusion  150 mL/hr IntraVENous CONTINUOUS    sodium chloride (NS) flush 5-40 mL  5-40 mL IntraVENous Q8H    sodium chloride (NS) flush 5-40 mL  5-40 mL IntraVENous PRN    acetaminophen (TYLENOL) tablet 650 mg  650 mg Oral Q6H PRN    Or    acetaminophen (TYLENOL) suppository 650 mg  650 mg Rectal Q6H PRN    polyethylene glycol (MIRALAX) packet 17 g  17 g Oral DAILY PRN    promethazine (PHENERGAN) tablet 12.5 mg  12.5 mg Oral Q6H PRN    Or    ondansetron (ZOFRAN) injection 4 mg  4 mg IntraVENous Q6H PRN    heparin (porcine) injection 5,000 Units  5,000 Units SubCUTAneous Q8H    arformoteroL (BROVANA) neb solution 15 mcg  15 mcg Nebulization BID RT    budesonide (PULMICORT) 500 mcg/2 ml nebulizer suspension  500 mcg Nebulization BID RT          Total time spent with patient: 35 minutes                  Care Plan discussed with: Care Manager, Nursing Staff and >50% of time spent in counseling and coordination of care    Discussed:  Care Plan              This document in whole or part of it has been produced using voice recognition software. Unrecognized errors in transcription may be present.     Attending Physician: Anali Orr MD

## 2020-12-02 NOTE — PROGRESS NOTES
Problem: Self Care Deficits Care Plan (Adult)  Goal: *Acute Goals and Plan of Care (Insert Text)  Description: Occupational Therapy Goals  Initiated 12/2/2020 within 7 day(s). 1.  Patient will perform self-feeding and grooming with modified independence. 2.  Patient will perform bathing with modified independence. 3.  Patient will perform upper body dressing and lower body dressing with modified independence. 4.  Patient will perform toilet transfers with modified independence using RW. 5.  Patient will perform all aspects of toileting with modified independence. 6.  Patient will participate in upper extremity therapeutic exercise/activities with modified independence for 8 minutes with no c/o pain. 7.  Patient will utilize energy conservation techniques during functional activities with min verbal cues. Prior Level of Function: Mod I with ADLs and functional mobility using rollator prior to onset of Covid19 pandemic, which has resulted in pt remaining in bed, refusing mobility and/or participation in ADLs e.g. showers  OCCUPATIONAL THERAPY EVALUATION    Patient: Brianna Beaver (66 y.o. female)  Date: 12/2/2020  Primary Diagnosis: Hypercalcemia [E83.52]        Precautions:  Fall  PLOF: see above    ASSESSMENT :  Nursing/RN cleared for pt to participate in OT evaluation and tx session. Patient with mild dementia admitted to hospital d/t vomiting, decreased activity with getting out of bed and/or participation in ADLs e.g. showers since onset of Covid-19, (R)shoulder x-ray r/o fx, CT scan: RLL soft tissue mass suspicious areas on lymph nodes and soft tissue, diagnosed with hypercalcemia. Patient presents lying supine in bed and sleeping with poor positioning while lying diagonally across bed, A & O x 1 (self), re-orientated to place, situation and date, patient c/o 10/10 pain right shoulder-nursing notified.  Bed mobility: unable to perform supine to sit edge of bed d/t c/o pain right shoulder and yelling out in pain with mvmt, dep x 2 for scooting up towards head of bed and midline, pt opted to lay on right side resulting in pressure through right shoulder, attempt to assist onto lying on back for pressure relief from right shoulder with LUE AROM WFL, RUE shoulder flexion impaired-tolerated PROM shoulder flexion to 90 degrees with no c/o pain, pt opted to lay onto right side/shoulder again, nursing notified. Patient able to demonstrated ability to retrieve cup of water with LUE from bedside table on right side, bring to mouth to drink and replace water onto bedside table-nursing notified. Call bell within reach & pt verbalized understanding following repetition with education for how to utilize for assist e.g. functional transfers in order to prevent falls, bed alarm intact. Patient will benefit from skilled intervention to address the above impairments.   Patient's rehabilitation potential is considered to be Guarded  Factors which may influence rehabilitation potential include:   []             None noted  [x]             Mental ability/status  []             Medical condition  [x]             Home/family situation and support systems  [x]             Safety awareness  [x]             Pain tolerance/management  []             Other:      PLAN :  Recommendations and Planned Interventions:   [x]               Self Care Training                  [x]      Therapeutic Activities  [x]               Functional Mobility Training   []      Cognitive Retraining  [x]               Therapeutic Exercises           [x]      Endurance Activities  [x]               Balance Training                    [x]      Neuromuscular Re-Education  []               Visual/Perceptual Training     [x]      Home Safety Training  [x]               Patient Education                   [x]      Family Training/Education  []               Other (comment):    Frequency/Duration: Patient will be followed by occupational therapy for 3 day trial, if progress is made proceed with 3-5 times a week to address goals. Discharge Recommendations: To Be Determined  Further Equipment Recommendations for Discharge: to be determined as progress is made with therapy       SUBJECTIVE:   Patient stated My shoulder hurts!     OBJECTIVE DATA SUMMARY:     Past Medical History:   Diagnosis Date    Anxiety associated with depression     Stone's esophagus     CAD (coronary artery disease)     Carotid artery disease (HCC)     COPD (chronic obstructive pulmonary disease) (HCC)     CVD (cardiovascular disease)     DJD (degenerative joint disease)     Emphysema of lung (HCC)     GERD (gastroesophageal reflux disease)     History of cervical cancer     HLD (hyperlipidemia)     Hypercholesteremia     Hypertension     Lung cancer (HCC)     [id number], s/p definitive radiation    Thyroid disease     Tobacco abuse      Past Surgical History:   Procedure Laterality Date    COLONOSCOPY N/A 2/28/2018    COLONOSCOPY performed by Chetna Rivas MD at [facility] ENDOSCOPY    HX CATARACT REMOVAL      HX OVARIAN CYST REMOVAL      NEUROLOGICAL PROCEDURE UNLISTED      cervical discetomy and fusion    VASCULAR SURGERY PROCEDURE UNLIST      carotid artery     Barriers to Learning/Limitations: yes;  cognitive, physical, and altered mental status (i.e.Sedation, Confusion)  Compensate with: visual, verbal, tactile, kinesthetic cues/model    Home Situation:   Home Situation  Home Environment: Assisted living  [address] [place] [address] Name: ([address] [place] [address] on [place])  One/Two Story Residence: One story  Living Alone: No  Support Systems: Assisted living, Friends \ neighbors, Family member(s)  Patient Expects to be Discharged to[de-identified] Assisted living  Current DME Used/Available at Home: chad Santillan  []  Right hand dominant   []  Left hand dominant    Cognitive/Behavioral Status:  Neurologic State: Confused;Drowsy; Eyes open to voice;Irritable  Orientation Level: Oriented to person;Disoriented to place; Disoriented to situation;Disoriented to time  Cognition: Decreased command following;Poor safety awareness  Safety/Judgement: Fall prevention;Decreased insight into deficits; Decreased awareness of environment    Skin: appears intact    Edema: none noted    Vision/Perceptual:  appears intact    Coordination: BUE  Coordination: (LUE WFL, RUE impaired)  Fine Motor Skills-Upper: Left Intact; Right Intact    Gross Motor Skills-Upper: Left Intact; Right Impaired    Strength: BUE  Strength: (LUE 3/5, RUE 2-/5)     Tone & Sensation: BUE  Tone: Normal(BUEs)  Sensation: Intact(BUEs)     Range of Motion: BUE  AROM: (LUE WFL, RUE impaired shoulder flex-pt guarding LUE d/t p!)  PROM: (RUE shldr flexion 90 degrees)     Functional Mobility and Transfers for ADLs:  Bed Mobility:    Scooting: Total assistance;Assist x2  Transfers: Toilet Transfer : Other (comment)(unable to achieve)    ADL Assessment:   Feeding: Setup;Supervision    Oral Facial Hygiene/Grooming: Moderate assistance    Bathing: Maximum assistance    Upper Body Dressing: Maximum assistance    Lower Body Dressing: Total assistance    Toileting: Total assistance    ADL Intervention:  Patient presents lying supine in bed and sleeping with poor positioning while lying diagonally across bed, A & O x 1 (self), re-orientated to place, situation and date, patient c/o 10/10 pain right shoulder-nursing notified. Bed mobility: unable to perform supine to sit edge of bed d/t c/o pain right shoulder and yelling out in pain with mvmt, dep x 2 for scooting up towards head of bed and midline, pt opted to lay on right side resulting in pressure through right shoulder, attempt to assist onto lying on back for pressure relief from right shoulder with LUE AROM WFL, RUE shoulder flexion impaired-tolerated PROM shoulder flexion to 90 degrees with no c/o pain, pt opted to lay onto right side/shoulder again, nursing notified.  Patient able to demonstrated ability to retrieve cup of water with LUE from bedside table on right side, bring to mouth to drink and replace water onto bedside table-nursing notified. Cognitive Retraining  Safety/Judgement: Fall prevention;Decreased insight into deficits; Decreased awareness of environment    Pain:  Pain level pre-treatment: 10/10   Pain level post-treatment: 10/10   Pain Intervention(s): Medication (see MAR); Rest, Ice, Repositioning   Response to intervention: Nurse notified, See doc flow    Activity Tolerance:   poor  Please refer to the flowsheet for vital signs taken during this treatment. After treatment:   [] Patient left in no apparent distress sitting up in chair  [x] Patient left in no apparent distress in bed  [x] Call bell left within reach  [x] Nursing notified  [] Caregiver present  [x] Bed alarm activated    COMMUNICATION/EDUCATION:   [x] Role of Occupational Therapy in the acute care setting  [] Home safety education was provided and the patient/caregiver indicated understanding. [] Patient/family have participated as able in goal setting and plan of care. [] Patient/family agree to work toward stated goals and plan of care. [] Patient understands intent and goals of therapy, but is neutral about his/her participation. [x] Patient is unable to participate in goal setting and plan of care. Thank you for this referral.  Jeff Ryan  Time Calculation: 12 mins    Eval Complexity: History: MEDIUM Complexity : Expanded review of history including physical, cognitive and psychosocial  history ; Examination: MEDIUM Complexity : 3-5 performance deficits relating to physical, cognitive , or psychosocial skils that result in activity limitations and / or participation restrictions; Decision Making:MEDIUM Complexity : Patient may present with comorbidities that affect occupational performnce.  Miniml to moderate modification of tasks or assistance (eg, physical or verbal ) with assesment(s) is necessary to enable patient to complete evaluation

## 2020-12-02 NOTE — PROGRESS NOTES
9452: PT orders received and chart reviewed. Sleeping soundly; awakens to voice. Declines PT stating \"Not today\". Will follow up.   1055: 2nd PT attempt. Remains sleeping; awakens to voice. Continues to decline PT. Will follow up.

## 2020-12-03 LAB
ANION GAP SERPL CALC-SCNC: 6 MMOL/L (ref 3–18)
ATRIAL RATE: 61 BPM
BASOPHILS # BLD: 0 K/UL (ref 0–0.1)
BASOPHILS NFR BLD: 0 % (ref 0–2)
BUN SERPL-MCNC: 10 MG/DL (ref 7–18)
BUN/CREAT SERPL: 11 (ref 12–20)
CALCIUM SERPL-MCNC: 10.9 MG/DL (ref 8.5–10.1)
CALCULATED P AXIS, ECG09: 69 DEGREES
CALCULATED R AXIS, ECG10: 13 DEGREES
CALCULATED T AXIS, ECG11: 69 DEGREES
CHLORIDE SERPL-SCNC: 98 MMOL/L (ref 100–111)
CO2 SERPL-SCNC: 29 MMOL/L (ref 21–32)
CREAT SERPL-MCNC: 0.95 MG/DL (ref 0.6–1.3)
DIAGNOSIS, 93000: NORMAL
DIFFERENTIAL METHOD BLD: ABNORMAL
EOSINOPHIL # BLD: 0.2 K/UL (ref 0–0.4)
EOSINOPHIL NFR BLD: 3 % (ref 0–5)
ERYTHROCYTE [DISTWIDTH] IN BLOOD BY AUTOMATED COUNT: 16.7 % (ref 11.6–14.5)
GLUCOSE SERPL-MCNC: 68 MG/DL (ref 74–99)
HCT VFR BLD AUTO: 34.4 % (ref 35–45)
HGB BLD-MCNC: 10.4 G/DL (ref 12–16)
LYMPHOCYTES # BLD: 0.7 K/UL (ref 0.9–3.6)
LYMPHOCYTES NFR BLD: 8 % (ref 21–52)
MAGNESIUM SERPL-MCNC: 1.4 MG/DL (ref 1.6–2.6)
MCH RBC QN AUTO: 25.6 PG (ref 24–34)
MCHC RBC AUTO-ENTMCNC: 30.2 G/DL (ref 31–37)
MCV RBC AUTO: 84.7 FL (ref 74–97)
MONOCYTES # BLD: 0.8 K/UL (ref 0.05–1.2)
MONOCYTES NFR BLD: 10 % (ref 3–10)
NEUTS SEG # BLD: 6.4 K/UL (ref 1.8–8)
NEUTS SEG NFR BLD: 79 % (ref 40–73)
P-R INTERVAL, ECG05: 164 MS
PHOSPHATE SERPL-MCNC: 1.4 MG/DL (ref 2.5–4.9)
PLATELET # BLD AUTO: 280 K/UL (ref 135–420)
PMV BLD AUTO: 10.1 FL (ref 9.2–11.8)
POTASSIUM SERPL-SCNC: 3.1 MMOL/L (ref 3.5–5.5)
Q-T INTERVAL, ECG07: 428 MS
QRS DURATION, ECG06: 94 MS
QTC CALCULATION (BEZET), ECG08: 430 MS
RBC # BLD AUTO: 4.06 M/UL (ref 4.2–5.3)
SODIUM SERPL-SCNC: 133 MMOL/L (ref 136–145)
VENTRICULAR RATE, ECG03: 61 BPM
WBC # BLD AUTO: 8.2 K/UL (ref 4.6–13.2)

## 2020-12-03 PROCEDURE — 85025 COMPLETE CBC W/AUTO DIFF WBC: CPT

## 2020-12-03 PROCEDURE — 93005 ELECTROCARDIOGRAM TRACING: CPT

## 2020-12-03 PROCEDURE — 65660000000 HC RM CCU STEPDOWN

## 2020-12-03 PROCEDURE — 80048 BASIC METABOLIC PNL TOTAL CA: CPT

## 2020-12-03 PROCEDURE — 97535 SELF CARE MNGMENT TRAINING: CPT

## 2020-12-03 PROCEDURE — 36415 COLL VENOUS BLD VENIPUNCTURE: CPT

## 2020-12-03 PROCEDURE — 74011000250 HC RX REV CODE- 250: Performed by: INTERNAL MEDICINE

## 2020-12-03 PROCEDURE — 97162 PT EVAL MOD COMPLEX 30 MIN: CPT

## 2020-12-03 PROCEDURE — 74011250636 HC RX REV CODE- 250/636: Performed by: INTERNAL MEDICINE

## 2020-12-03 PROCEDURE — 77010033678 HC OXYGEN DAILY

## 2020-12-03 PROCEDURE — 2709999900 HC NON-CHARGEABLE SUPPLY

## 2020-12-03 PROCEDURE — 84100 ASSAY OF PHOSPHORUS: CPT

## 2020-12-03 PROCEDURE — 74011250637 HC RX REV CODE- 250/637: Performed by: INTERNAL MEDICINE

## 2020-12-03 PROCEDURE — 83735 ASSAY OF MAGNESIUM: CPT

## 2020-12-03 PROCEDURE — 77030038269 HC DRN EXT URIN PURWCK BARD -A

## 2020-12-03 RX ORDER — SODIUM,POTASSIUM PHOSPHATES 280-250MG
2 POWDER IN PACKET (EA) ORAL 4 TIMES DAILY
Status: COMPLETED | OUTPATIENT
Start: 2020-12-03 | End: 2020-12-04

## 2020-12-03 RX ORDER — SODIUM,POTASSIUM PHOSPHATES 280-250MG
1 POWDER IN PACKET (EA) ORAL 4 TIMES DAILY
Status: DISCONTINUED | OUTPATIENT
Start: 2020-12-03 | End: 2020-12-03

## 2020-12-03 RX ORDER — MAGNESIUM SULFATE HEPTAHYDRATE 40 MG/ML
2 INJECTION, SOLUTION INTRAVENOUS ONCE
Status: COMPLETED | OUTPATIENT
Start: 2020-12-03 | End: 2020-12-03

## 2020-12-03 RX ADMIN — HEPARIN SODIUM 5000 UNITS: 5000 INJECTION INTRAVENOUS; SUBCUTANEOUS at 13:10

## 2020-12-03 RX ADMIN — Medication 10 ML: at 21:17

## 2020-12-03 RX ADMIN — ACETAMINOPHEN 650 MG: 325 TABLET, FILM COATED ORAL at 09:53

## 2020-12-03 RX ADMIN — PANTOPRAZOLE SODIUM 40 MG: 40 TABLET, DELAYED RELEASE ORAL at 09:53

## 2020-12-03 RX ADMIN — POTASSIUM & SODIUM PHOSPHATES POWDER PACK 280-160-250 MG 2 PACKET: 280-160-250 PACK at 17:52

## 2020-12-03 RX ADMIN — LEVOTHYROXINE SODIUM 150 MCG: 0.07 TABLET ORAL at 09:52

## 2020-12-03 RX ADMIN — DONEPEZIL HYDROCHLORIDE 5 MG: 5 TABLET, FILM COATED ORAL at 21:14

## 2020-12-03 RX ADMIN — ACETAMINOPHEN 650 MG: 325 TABLET, FILM COATED ORAL at 17:52

## 2020-12-03 RX ADMIN — MAGNESIUM SULFATE HEPTAHYDRATE 2 G: 40 INJECTION, SOLUTION INTRAVENOUS at 09:50

## 2020-12-03 RX ADMIN — LAMOTRIGINE 100 MG: 100 TABLET ORAL at 09:52

## 2020-12-03 RX ADMIN — BUDESONIDE 500 MCG: 0.5 INHALANT RESPIRATORY (INHALATION) at 21:17

## 2020-12-03 RX ADMIN — POTASSIUM & SODIUM PHOSPHATES POWDER PACK 280-160-250 MG 2 PACKET: 280-160-250 PACK at 21:17

## 2020-12-03 RX ADMIN — AMLODIPINE BESYLATE 10 MG: 10 TABLET ORAL at 09:52

## 2020-12-03 RX ADMIN — CITALOPRAM HYDROBROMIDE 20 MG: 20 TABLET ORAL at 09:52

## 2020-12-03 RX ADMIN — ATORVASTATIN CALCIUM 40 MG: 20 TABLET, FILM COATED ORAL at 09:52

## 2020-12-03 RX ADMIN — POTASSIUM CHLORIDE 40 MEQ: 1500 TABLET, EXTENDED RELEASE ORAL at 09:52

## 2020-12-03 RX ADMIN — MONTELUKAST 10 MG: 10 TABLET, FILM COATED ORAL at 21:14

## 2020-12-03 RX ADMIN — LISINOPRIL 20 MG: 20 TABLET ORAL at 09:53

## 2020-12-03 RX ADMIN — BUDESONIDE 500 MCG: 0.5 INHALANT RESPIRATORY (INHALATION) at 09:52

## 2020-12-03 RX ADMIN — ARFORMOTEROL TARTRATE 15 MCG: 15 SOLUTION RESPIRATORY (INHALATION) at 21:17

## 2020-12-03 RX ADMIN — HEPARIN SODIUM 5000 UNITS: 5000 INJECTION INTRAVENOUS; SUBCUTANEOUS at 21:16

## 2020-12-03 RX ADMIN — ASPIRIN 81 MG: 81 TABLET, CHEWABLE ORAL at 09:52

## 2020-12-03 RX ADMIN — POTASSIUM & SODIUM PHOSPHATES POWDER PACK 280-160-250 MG 2 PACKET: 280-160-250 PACK at 13:10

## 2020-12-03 RX ADMIN — FLUTICASONE PROPIONATE 2 SPRAY: 50 SPRAY, METERED NASAL at 09:49

## 2020-12-03 RX ADMIN — ACETAMINOPHEN 650 MG: 325 TABLET, FILM COATED ORAL at 21:24

## 2020-12-03 RX ADMIN — ARFORMOTEROL TARTRATE 15 MCG: 15 SOLUTION RESPIRATORY (INHALATION) at 09:52

## 2020-12-03 RX ADMIN — POTASSIUM & SODIUM PHOSPHATES POWDER PACK 280-160-250 MG 1 PACKET: 280-160-250 PACK at 09:53

## 2020-12-03 RX ADMIN — FOLIC ACID 1 MG: 1 TABLET ORAL at 09:52

## 2020-12-03 NOTE — ROUTINE PROCESS
1945: Assumed care. Awake. Quietly resting in bed. HOB elevated. No SOB on RA. Call light within reach. Bed alarm on & at the lowest level. 2232: Due meds given. 0019: No change form previous assessment. 0230: Notified by the monitor tech. SR changed to A flutter. Paged & talked to Dr. Jaxson Hahn. Order received for stat EKG. 0255: EKG done. SR HR 60's.     0301: No change from previous assessment. 0600: Slept on & off thru night. Needs attended. 0710: Incontinence care provided. 0747:Bedside and Verbal shift change report given to [facility] (oncoming nurse) by me (offgoing nurse). Report included the following information SBAR, Kardex, Intake/Output, MAR, Recent Results and Cardiac Rhythm SR w/ PAC's.

## 2020-12-03 NOTE — PROGRESS NOTES
Problem: Falls - Risk of  Goal: *Absence of Falls  Description: Document Albino Messing Fall Risk and appropriate interventions in the flowsheet. Outcome: Progressing Towards Goal  Note: Fall Risk Interventions:  Mobility Interventions: Bed/chair exit alarm, Communicate number of staff needed for ambulation/transfer, Patient to call before getting OOB, PT Consult for mobility concerns    Mentation Interventions: Adequate sleep, hydration, pain control, Bed/chair exit alarm, Door open when patient unattended, Reorient patient, Room close to nurse's station    Medication Interventions: Bed/chair exit alarm, Patient to call before getting OOB, Teach patient to arise slowly    Elimination Interventions: Bed/chair exit alarm, Call light in reach, Patient to call for help with toileting needs    History of Falls Interventions: Bed/chair exit alarm, Door open when patient unattended, Room close to nurse's station         Problem: Patient Education: Go to Patient Education Activity  Goal: Patient/Family Education  Outcome: Progressing Towards Goal     Problem: Pressure Injury - Risk of  Goal: *Prevention of pressure injury  Description: Document Jose Scale and appropriate interventions in the flowsheet. Outcome: Progressing Towards Goal  Note: Pressure Injury Interventions:  Sensory Interventions: Keep linens dry and wrinkle-free, Maintain/enhance activity level, Minimize linen layers, Use 30-degree side-lying position    Moisture Interventions: Absorbent underpads, Apply protective barrier, creams and emollients, Internal/External urinary devices, Maintain skin hydration (lotion/cream)    Activity Interventions: Pressure redistribution bed/mattress(bed type), PT/OT evaluation    Mobility Interventions: HOB 30 degrees or less, Pressure redistribution bed/mattress (bed type), Turn and reposition approx.  every two hours(pillow and wedges)    Nutrition Interventions: Document food/fluid/supplement intake, Offer support with meals,snacks and hydration    Friction and Shear Interventions: HOB 30 degrees or less, Minimize layers                Problem: Patient Education: Go to Patient Education Activity  Goal: Patient/Family Education  Outcome: Progressing Towards Goal     Problem: Discharge Planning  Goal: *Discharge to safe environment  Outcome: Progressing Towards Goal     Problem: Hypertension  Goal: *Blood pressure within specified parameters  Outcome: Progressing Towards Goal  Goal: *Fluid volume balance  Outcome: Progressing Towards Goal  Goal: *Labs within defined limits  Outcome: Progressing Towards Goal     Problem: Chronic Obstructive Pulmonary Disease (COPD)  Goal: *Oxygen saturation during activity within specified parameters  Outcome: Progressing Towards Goal  Goal: *Able to remain out of bed as prescribed  Outcome: Progressing Towards Goal  Goal: *Absence of hypoxia  Outcome: Progressing Towards Goal  Goal: *Optimize nutritional status  Outcome: Progressing Towards Goal     Problem: Patient Education: Go to Patient Education Activity  Goal: Patient/Family Education  Outcome: Progressing Towards Goal     Problem: Patient Education: Go to Patient Education Activity  Goal: Patient/Family Education  Outcome: Progressing Towards Goal     Problem: Pain  Goal: *Control of Pain  Outcome: Progressing Towards Goal     Problem: Patient Education: Go to Patient Education Activity  Goal: Patient/Family Education  Outcome: Progressing Towards Goal

## 2020-12-03 NOTE — PROGRESS NOTES
Problem: Mobility Impaired (Adult and Pediatric)  Goal: *Acute Goals and Plan of Care (Insert Text)  Description: Physical Therapy Goals  Initiated 12/3/2020 and to be accomplished within 7 day(s)  1. Patient will move from supine to sit and sit to supine  in bed with minimal assistance/contact guard assist.    2.  Patient will transfer from bed to chair and chair to bed with minimal assistance/contact guard assist using the least restrictive device. 3.  Patient will perform sit to stand with minimal assistance/contact guard assist.  4.  Patient will ambulate with minimal assistance/contact guard assist for 50 feet with the least restrictive device. Outcome: Progressing Towards Goal  PHYSICAL THERAPY EVALUATION    Patient: Benjamin Ledbetter (51 y.o. female)  Date: 12/3/2020  Primary Diagnosis: Hypercalcemia [E83.52]  Precautions: Fall  PLOF: Mod I; Assisted Living  ASSESSMENT :  Oriented to person only. Disoriented to time and place. Provided encouragement and education for PT. Encouraged to participate at \"Mariel's request\" (POA ); declines stating \"just tell her yes\". Attempted to prepare for trial of supine to sit to assist in positioning for pain management; patient yelling prior to PT light touch about pain in right shoulder. Unable to console; 3 episodes of unexpected yelling out d/t pain in right shoulder when no one touching patient. Able to work BLE off EOB with min A; refused further mobility. Total A x2 for scooting up in bed. Self selected to roll to left side with supervision. Remained in left side lying at end of session. Call bell in reach. Patient will benefit from skilled intervention to address the above impairments.   Patient's rehabilitation potential is considered to be Fair  Factors which may influence rehabilitation potential include:   []         None noted  []         Mental ability/status  [x]         Medical condition  []         Home/family situation and support systems  [] Safety awareness  []         Pain tolerance/management  []         Other:      PLAN :  Recommendations and Planned Interventions:   [x]           Bed Mobility Training             [x]    Neuromuscular Re-Education  [x]           Transfer Training                   []    Orthotic/Prosthetic Training  [x]           Gait Training                          []    Modalities  [x]           Therapeutic Exercises           []    Edema Management/Control  [x]           Therapeutic Activities            [x]    Family Training/Education  [x]           Patient Education  []           Other (comment):    Frequency/Duration: Patient will be followed by physical therapy 3-5 times a week to address goals. Discharge Recommendations: Kamlesh Collazo  Further Equipment Recommendations for Discharge: portable oxygen, rollator; has     SUBJECTIVE:   Patient stated Just tell her yes.     OBJECTIVE DATA SUMMARY:     Past Medical History:   Diagnosis Date    Anxiety associated with depression     Stone's esophagus     CAD (coronary artery disease)     Carotid artery disease (HCC)     COPD (chronic obstructive pulmonary disease) (HCC)     CVD (cardiovascular disease)     DJD (degenerative joint disease)     Emphysema of lung (HCC)     GERD (gastroesophageal reflux disease)     History of cervical cancer     HLD (hyperlipidemia)     Hypercholesteremia     Hypertension     Lung cancer (HCC)     [id number], s/p definitive radiation    Thyroid disease     Tobacco abuse      Past Surgical History:   Procedure Laterality Date    COLONOSCOPY N/A 2/28/2018    COLONOSCOPY performed by Emelia Hart MD at [address] [place] [address] Drive ENDOSCOPY    HX CATARACT REMOVAL      HX OVARIAN CYST REMOVAL      NEUROLOGICAL PROCEDURE UNLISTED      cervical discetomy and fusion    VASCULAR SURGERY PROCEDURE UNLIST      carotid artery     Barriers to Learning/Limitations: yes;  cognitive and altered mental status (i.e.Sedation, Confusion)  Compensate with: Visual Cues, Verbal Cues, Tactile Cues and Kinesthetic Cues    Home Situation:  Home Situation  Home Environment: [facility] [address] Name: ([address] [place] [address] on [place])  One/Two Story Residence: One story  Living Alone: No  Support Systems: Assisted living  Patient Expects to be Discharged to[de-identified] Assisted living  Current DME Used/Available at Home: Connee Simmering, rollator, Oxygen, portable    Critical Behavior:  Neurologic State: Alert  Orientation Level: Oriented to person;Disoriented to place; Disoriented to situation;Disoriented to time  Cognition: Impaired decision making     Psychosocial  Patient Behaviors: Confused    Strength:    Manual Muscle Testing (LE)Per observed bed level AROM         R     L    Hip Flexion:   3/5  3/5  Knee EXT:   3/5  3/5  Knee FLEX:   3/5  3/5  Ankle DF:   3/5  3/5  _________________________________________________   Range Of Motion:  AROM decreased, functional in supine  Functional Mobility:  Bed Mobility:  Rolling: Supervision  Scooting: Total assistance;Assist x2    Pain:  Pain level pre-treatment: 8/10   Pain level post-treatment: 8/10     Activity Tolerance:   Poor    After treatment:   []         Patient left in no apparent distress sitting up in chair  [x]         Patient left in no apparent distress in bed  [x]         Call bell left within reach  [x]         Nursing notified  []         Caregiver present  []         Bed alarm activated  []         SCDs applied    COMMUNICATION/EDUCATION:   [x]         Role of physical therapy and plan of care in the acute care setting. [x]         Fall prevention education was provided and the patient/caregiver indicated understanding. [x]         Patient/family have participated as able in goal setting and plan of care. []         Patient/family agree to work toward stated goals and plan of care. []         Patient understands intent and goals of therapy, but is neutral about his/her participation.   [] Patient is unable to participate in goal setting/plan of care: ongoing with therapy staff.     Thank you for this referral.  Leigh Evans, PT   Time Calculation: 9 mins    Eval Complexity: History: MEDIUM  Complexity : 1-2 comorbidities / personal factors will impact the outcome/ POC Exam:MEDIUM Complexity : 3 Standardized tests and measures addressing body structure, function, activity limitation and / or participation in recreation  Presentation: MEDIUM Complexity : Evolving with changing characteristics  Clinical Decision Making:Medium Complexity   Clinical judgement; ROM, MMT, functional mobility Overall Complexity:MEDIUM

## 2020-12-03 NOTE — PROGRESS NOTES
Problem: Self Care Deficits Care Plan (Adult)  Goal: *Acute Goals and Plan of Care (Insert Text)  Description: Occupational Therapy Goals  Initiated 12/2/2020 within 7 day(s). 1.  Patient will perform self-feeding and grooming with modified independence. 2.  Patient will perform bathing with modified independence. 3.  Patient will perform upper body dressing and lower body dressing with modified independence. 4.  Patient will perform toilet transfers with modified independence using RW. 5.  Patient will perform all aspects of toileting with modified independence. 6.  Patient will participate in upper extremity therapeutic exercise/activities with modified independence for 8 minutes with no c/o pain. 7.  Patient will utilize energy conservation techniques during functional activities with min verbal cues. Prior Level of Function: Mod I with ADLs and functional mobility using rollator prior to onset of Covid19 pandemic, which has resulted in pt remaining in bed, refusing mobility and/or participation in ADLs e.g. showers   OCCUPATIONAL THERAPY TREATMENT    Patient: Bee Leblanc (89 y.o. female)  Date: 12/3/2020  Diagnosis: Hypercalcemia [E83.52]   Hypercalcemia       Precautions: Fall  Chart, occupational therapy assessment, plan of care, and goals were reviewed. ASSESSMENT:  Nursing/RN cleared pt to participate in OT tx. Patient presents with poor bed positioning I.e. lying diagonally across bed and scooted down towards foot of bed, noted soiled bed linens d/t urinary incontinence, pt reports she can sense when she needs to void, although declines to use bedside commode and refused to provide answer, stating \"You asked me and I told you no, now I have to give you a reason? \" Patient c/o 10/10 R shoulder pain, nursing present. Patient noted with ability to perform RUE shoulder flexion WFL and no c/o pain during AROM.  Dep x 2 scooting up towards head of bed, pt exhibited hollering out and ceased once positioning finished. Max MARISA rolling R <-> L for changing bed linens, pt hollering out with movement and yelling \"Don't let me fall!\", pt re-directed she will not fall. Max vc's to initiate anterior perihygiene while lying in bed, repetition to initiate w/ SBA, dep LB bathing and doff/don slipper socks. Abel CUNNINGHAM doff and don hospital gown. Patient dep with set up for meal e.g. cutting food and opening containers, max vc's to initiate self feeding, pt observed using dominant RUE/hand with fork to retrieve food and bring to mouth to eat with Mod I. Patient sitting upright in bed at end of tx session, eating breakfast meal, call bell within reach & pt verbalized understanding to utilize for assist e.g. functional transfers in order to prevent falls, bed alarm intact. Progression toward goals:  []          Improving appropriately and progressing toward goals  [x]          Improving slowly and progressing toward goals  []          Not making progress toward goals and plan of care will be adjusted     PLAN:  Patient continues to benefit from skilled intervention to address the above impairments. Continue treatment per established plan of care. Discharge Recommendations:  Skilled Nursing Facility  Further Equipment Recommendations for Discharge:  to be determined as progress is made with therapy       SUBJECTIVE:   Patient stated I don't want to use the bedside commode.     OBJECTIVE DATA SUMMARY:   Cognitive/Behavioral Status:  Neurologic State: Alert  Orientation Level: Oriented to person, Disoriented to place, Disoriented to situation, Disoriented to time  Cognition: Impaired decision making  Safety/Judgement: Fall prevention, Decreased insight into deficits, Decreased awareness of environment    Functional Mobility and Transfers for ADLs:   Bed Mobility        Scooting: Total assistance;Assist x2    ADL Intervention:  Feeding  Container Management: Total assistance (dependent)  Cutting Food:  Total assistance (dependent)  Food to Mouth: Modified independent; Set-up  Cues: Verbal cues provided(max to initiate)       Lower Body Bathing  Perineal  : Stand-by assistance(anterior periarea)  Position Performed: Supine  Cues: Verbal cues provided(max to initiate)  Lower Body : Total assistance (dependent)  Position Performed: Supine    Upper Body Dressing Assistance  Shirt simulation with hospital gown: Total assistance (dependent)    Lower Body Dressing Assistance  Socks: Total assistance (dependent)    Pain:  Pain level pre-treatment: 10/10   Pain level post-treatment: 10/10  Pain Intervention(s): Medication (see MAR); Rest, Ice, Repositioning   Response to intervention: Nurse notified, See doc flow    Activity Tolerance:    poor  Please refer to the flowsheet for vital signs taken during this treatment. After treatment:   []  Patient left in no apparent distress sitting up in chair  [x]  Patient left in no apparent distress in bed  [x]  Call bell left within reach  [x]  Nursing notified  []  Caregiver present  []  Bed alarm activated    COMMUNICATION/EDUCATION:   [x] Role of Occupational Therapy in the acute care setting  [x] Home safety education was provided and the patient/caregiver indicated understanding. [x] Patient/family have participated as able in working towards goals and plan of care. [x] Patient/family agree to work toward stated goals and plan of care. [] Patient understands intent and goals of therapy, but is neutral about his/her participation. [] Patient is unable to participate in goal setting and plan of care.       Thank you for this referral.  Alexsander Souza  Time Calculation: 22 mins

## 2020-12-03 NOTE — PROGRESS NOTES
Internal Medicine Progress Note        NAME: Gab Lazo   :  1945  MRM:  [id number]    Date/Time: 12/3/2020        ASSESSMENT/PLAN:     #  Hypercalcemia (2020). Improved with IVF  Hydration. Continue ivf     Followed labs. No Ca supplements for now      #   COPD (chronic obstructive pulmonary disease) ([facility] (4/10/2019). Prn nebs     #  Hypokalemia , Hypo po4, hypo mag. . Replete. Trend.      #  Possible  Metabolic encephalopathy due to Metabolic derangement . Ho dementia on aricept. Unclear base line mentation level   Mentally improved with chemical improvement in her blood test       #  Hyponatremia (2020). Improved. IVF NS . On presentation to ER clinically patient was looking dry .         # ho   Lung cancer . Squamous cell lung cancer on XRT. Records from oncology clinic.   no current chemo or radio- [id number] oncology       # Chronic respiratory failure ([facility] [address] (2020) on home o2. Maintain o2     #  HTN (hypertension) (2020). Control BP      # Acquired hypothyroidism (2020) on Synthroid. TSH 72 in ER on presentation . Unclear if she is taking her meds regularly. Also been vomiting lately per POA. Possible not taking the medicine correctly ie they give her all meds at same time   Synthroid dose increased slightly   Monitor TSH/FT4 tests       # ho  [facility])     # Continue home regimen / Medications when appropriate.      -DVT prophylaxis :  heparin.   - Code Status : DNR    Talked Jim Toussaint  [phone] . Pt been declining over last 2 weeks. She is not on chemo or radio now. She follows with Dr Luaren Esteban. Clinical condition and possible prognosis dw poa. Questions answered to apparent satisfaction.      Lab Review:     Recent Labs     20  0425 20  0400 20  1320   WBC 8.2 9.1 11.3   HGB 10.4* 10.3* 12.3   HCT 34.4* 34.6* 40.6    277 273     Recent Labs     20  0425 20  0400 20  1320   * 136 131*   K 3. 1* 3.5 3.1*   CL 98* 99* 91*   CO2 29 30 32   GLU 68* 57* 91   BUN 10 15 16   CREA 0.95 0.97 1.18   CA 10.9* 11.8* 13.8*   MG 1.4* 1.7 1.8   PHOS 1.4* 2.1*  --    ALB  --  2.4* 2.7*   TBILI  --  0.3 0.4   ALT  --  7* 8*   INR  --  1.1  --      Lab Results   Component Value Date/Time    Glucose (POC) 174 (H) 04/14/2019 09:42 PM    Glucose, POC 92 02/28/2018 10:49 AM     No results for input(s): PH, PCO2, PO2, HCO3, FIO2 in the last 72 hours. Recent Labs     12/02/20  0400   INR 1.1       No results found for: SDES  No results found for: CULT    All Cardiac Markers in the last 24 hours:   No results found for: CPK, CK, CKMMB, CKMB, RCK3, CKMBT, CKNDX, CKND1, SHANTI, TROPT, TROIQ, BARRY, TROPT, TNIPOC, BNP, BNPP        Intervals noted   Pt s/e @ bedside     Subjective:     Chief Complaint:  Offer no complain  Eating better, more alert and chatting, joking  Seen with her nurse at bed side. Taking her po meds          Objective:     Vitals:  Last 24hrs VS reviewed since prior progress note. Most recent are:    Visit Vitals  BP (!) 156/81   Pulse 62   Temp 97.5 °F (36.4 °C)   Resp 18   Wt 52.5 kg (115 lb 11.2 oz)   SpO2 99%   BMI 23.37 kg/m²     SpO2 Readings from Last 6 Encounters:   12/03/20 99%   11/18/20 95%   11/13/20 100%   09/17/19 98%   04/16/19 95%   02/28/18 97%    O2 Flow Rate (L/min): 2 l/min       Intake/Output Summary (Last 24 hours) at 12/3/2020 1125  Last data filed at 12/3/2020 9778  Gross per 24 hour   Intake 2930 ml   Output 2900 ml   Net 30 ml          Physical Exam:   Gen:  Appear stated age, Well-developed,   in no acute distress  HEENT: upper and lower dentures,  Head atraumatic, normocephalic , hearing intact to voice, moist  mucous membranes. Neck:   Trachea midline , No apparent JVD, Supple,  thyroid non-tender  Resp:    No accessory muscle use,Bilateral BS present, clear breath sounds without wheezes rales or rhonchi  Card:   normal S1, S2 without Gallop .  No Significant lower leg peripheral edema. Abd:  Soft, non-tender, non-distended, bowel sounds are present    Musc: hard bony mass L upper back at L scapula region about 5 cm,  No cyanosis or clubbing. Skin:   Warm and dry . No rashes or ulcers   Neuro: more alert, chatting, and better orientation    No  facial asymmetry,  no clear area of focal motor weakness     alert.     Medications Reviewed: (see below)    Lab Data Reviewed: (see below)    ______________________________________________________________________    Medications:     Current Facility-Administered Medications   Medication Dose Route Frequency    potassium, sodium phosphates (NEUTRA-PHOS) packet 1 Packet  1 Packet Oral QID    levothyroxine (SYNTHROID) tablet 150 mcg  150 mcg Oral ACB    albuterol-ipratropium (DUO-NEB) 2.5 MG-0.5 MG/3 ML  3 mL Nebulization Q4H PRN    amLODIPine (NORVASC) tablet 10 mg  10 mg Oral DAILY    aspirin chewable tablet 81 mg  81 mg Oral DAILY    atorvastatin (LIPITOR) tablet 40 mg  40 mg Oral DAILY    [START ON 12/7/2020] ergocalciferol capsule 50,000 Units  50,000 Units Oral every Monday    citalopram (CELEXA) tablet 20 mg  20 mg Oral DAILY    donepeziL (ARICEPT) tablet 5 mg  5 mg Oral QHS    fluticasone propionate (FLONASE) 50 mcg/actuation nasal spray 2 Spray  2 Spray Both Nostrils DAILY    lamoTRIgine (LaMICtal) tablet 100 mg  100 mg Oral DAILY    folic acid (FOLVITE) tablet 1 mg  1 mg Oral DAILY    lisinopriL (PRINIVIL, ZESTRIL) tablet 20 mg  20 mg Oral DAILY    montelukast (SINGULAIR) tablet 10 mg  10 mg Oral QHS    pantoprazole (PROTONIX) tablet 40 mg  40 mg Oral ACB    potassium chloride (K-DUR, KLOR-CON) SR tablet 40 mEq  40 mEq Oral DAILY    0.9% sodium chloride infusion  75 mL/hr IntraVENous CONTINUOUS    sodium chloride (NS) flush 5-40 mL  5-40 mL IntraVENous Q8H    sodium chloride (NS) flush 5-40 mL  5-40 mL IntraVENous PRN    acetaminophen (TYLENOL) tablet 650 mg  650 mg Oral Q6H PRN    Or    acetaminophen (TYLENOL) suppository 650 mg  650 mg Rectal Q6H PRN    polyethylene glycol (MIRALAX) packet 17 g  17 g Oral DAILY PRN    promethazine (PHENERGAN) tablet 12.5 mg  12.5 mg Oral Q6H PRN    Or    ondansetron (ZOFRAN) injection 4 mg  4 mg IntraVENous Q6H PRN    heparin (porcine) injection 5,000 Units  5,000 Units SubCUTAneous Q8H    arformoteroL (BROVANA) neb solution 15 mcg  15 mcg Nebulization BID RT    budesonide (PULMICORT) 500 mcg/2 ml nebulizer suspension  500 mcg Nebulization BID RT          Total time spent with patient: 35 minutes                  Care Plan discussed with: Care Manager, Nursing Staff and >50% of time spent in counseling and coordination of care I called her POA again today     Discussed:  Care Plan              This document in whole or part of it has been produced using voice recognition software. Unrecognized errors in transcription may be present.     Attending Physician: Ramo Mckeon MD

## 2020-12-04 LAB
ANION GAP SERPL CALC-SCNC: 7 MMOL/L (ref 3–18)
BUN SERPL-MCNC: 10 MG/DL (ref 7–18)
BUN/CREAT SERPL: 11 (ref 12–20)
CALCIUM SERPL-MCNC: 10.5 MG/DL (ref 8.5–10.1)
CHLORIDE SERPL-SCNC: 97 MMOL/L (ref 100–111)
CO2 SERPL-SCNC: 29 MMOL/L (ref 21–32)
CREAT SERPL-MCNC: 0.93 MG/DL (ref 0.6–1.3)
GLUCOSE SERPL-MCNC: 63 MG/DL (ref 74–99)
MAGNESIUM SERPL-MCNC: 2 MG/DL (ref 1.6–2.6)
PHOSPHATE SERPL-MCNC: 2.2 MG/DL (ref 2.5–4.9)
POTASSIUM SERPL-SCNC: 3.6 MMOL/L (ref 3.5–5.5)
SODIUM SERPL-SCNC: 133 MMOL/L (ref 136–145)
T4 FREE SERPL-MCNC: 0.6 NG/DL (ref 0.7–1.5)
TSH SERPL DL<=0.05 MIU/L-ACNC: 77.9 UIU/ML (ref 0.36–3.74)

## 2020-12-04 PROCEDURE — 94761 N-INVAS EAR/PLS OXIMETRY MLT: CPT

## 2020-12-04 PROCEDURE — 74011250637 HC RX REV CODE- 250/637: Performed by: INTERNAL MEDICINE

## 2020-12-04 PROCEDURE — 74011000250 HC RX REV CODE- 250: Performed by: INTERNAL MEDICINE

## 2020-12-04 PROCEDURE — 74011250636 HC RX REV CODE- 250/636: Performed by: INTERNAL MEDICINE

## 2020-12-04 PROCEDURE — 83735 ASSAY OF MAGNESIUM: CPT

## 2020-12-04 PROCEDURE — 2709999900 HC NON-CHARGEABLE SUPPLY

## 2020-12-04 PROCEDURE — 94640 AIRWAY INHALATION TREATMENT: CPT

## 2020-12-04 PROCEDURE — 80048 BASIC METABOLIC PNL TOTAL CA: CPT

## 2020-12-04 PROCEDURE — 84439 ASSAY OF FREE THYROXINE: CPT

## 2020-12-04 PROCEDURE — 84443 ASSAY THYROID STIM HORMONE: CPT

## 2020-12-04 PROCEDURE — 36415 COLL VENOUS BLD VENIPUNCTURE: CPT

## 2020-12-04 PROCEDURE — 65660000000 HC RM CCU STEPDOWN

## 2020-12-04 PROCEDURE — 84100 ASSAY OF PHOSPHORUS: CPT

## 2020-12-04 PROCEDURE — 77010033678 HC OXYGEN DAILY

## 2020-12-04 PROCEDURE — 97110 THERAPEUTIC EXERCISES: CPT

## 2020-12-04 PROCEDURE — 77030038269 HC DRN EXT URIN PURWCK BARD -A

## 2020-12-04 RX ADMIN — ATORVASTATIN CALCIUM 40 MG: 20 TABLET, FILM COATED ORAL at 08:59

## 2020-12-04 RX ADMIN — LEVOTHYROXINE SODIUM 150 MCG: 0.07 TABLET ORAL at 06:52

## 2020-12-04 RX ADMIN — BUDESONIDE 500 MCG: 0.5 INHALANT RESPIRATORY (INHALATION) at 08:18

## 2020-12-04 RX ADMIN — POTASSIUM & SODIUM PHOSPHATES POWDER PACK 280-160-250 MG 2 PACKET: 280-160-250 PACK at 18:15

## 2020-12-04 RX ADMIN — HEPARIN SODIUM 5000 UNITS: 5000 INJECTION INTRAVENOUS; SUBCUTANEOUS at 20:35

## 2020-12-04 RX ADMIN — FLUTICASONE PROPIONATE 2 SPRAY: 50 SPRAY, METERED NASAL at 10:05

## 2020-12-04 RX ADMIN — POTASSIUM & SODIUM PHOSPHATES POWDER PACK 280-160-250 MG 2 PACKET: 280-160-250 PACK at 14:09

## 2020-12-04 RX ADMIN — HEPARIN SODIUM 5000 UNITS: 5000 INJECTION INTRAVENOUS; SUBCUTANEOUS at 14:09

## 2020-12-04 RX ADMIN — Medication 10 ML: at 06:53

## 2020-12-04 RX ADMIN — AMLODIPINE BESYLATE 10 MG: 10 TABLET ORAL at 08:59

## 2020-12-04 RX ADMIN — Medication 10 ML: at 22:28

## 2020-12-04 RX ADMIN — ACETAMINOPHEN 650 MG: 325 TABLET, FILM COATED ORAL at 18:15

## 2020-12-04 RX ADMIN — PANTOPRAZOLE SODIUM 40 MG: 40 TABLET, DELAYED RELEASE ORAL at 09:00

## 2020-12-04 RX ADMIN — ARFORMOTEROL TARTRATE 15 MCG: 15 SOLUTION RESPIRATORY (INHALATION) at 20:20

## 2020-12-04 RX ADMIN — SODIUM CHLORIDE 75 ML/HR: 900 INJECTION, SOLUTION INTRAVENOUS at 20:14

## 2020-12-04 RX ADMIN — MONTELUKAST 10 MG: 10 TABLET, FILM COATED ORAL at 22:26

## 2020-12-04 RX ADMIN — ASPIRIN 81 MG: 81 TABLET, CHEWABLE ORAL at 09:00

## 2020-12-04 RX ADMIN — LISINOPRIL 20 MG: 20 TABLET ORAL at 08:59

## 2020-12-04 RX ADMIN — POTASSIUM & SODIUM PHOSPHATES POWDER PACK 280-160-250 MG 2 PACKET: 280-160-250 PACK at 08:59

## 2020-12-04 RX ADMIN — Medication 10 ML: at 09:00

## 2020-12-04 RX ADMIN — BUDESONIDE 500 MCG: 0.5 INHALANT RESPIRATORY (INHALATION) at 20:20

## 2020-12-04 RX ADMIN — SODIUM CHLORIDE 75 ML/HR: 900 INJECTION, SOLUTION INTRAVENOUS at 06:53

## 2020-12-04 RX ADMIN — POTASSIUM & SODIUM PHOSPHATES POWDER PACK 280-160-250 MG 2 PACKET: 280-160-250 PACK at 22:27

## 2020-12-04 RX ADMIN — ARFORMOTEROL TARTRATE 15 MCG: 15 SOLUTION RESPIRATORY (INHALATION) at 08:18

## 2020-12-04 RX ADMIN — CITALOPRAM HYDROBROMIDE 20 MG: 20 TABLET ORAL at 08:59

## 2020-12-04 RX ADMIN — FOLIC ACID 1 MG: 1 TABLET ORAL at 09:00

## 2020-12-04 RX ADMIN — LAMOTRIGINE 100 MG: 100 TABLET ORAL at 08:59

## 2020-12-04 RX ADMIN — DONEPEZIL HYDROCHLORIDE 5 MG: 5 TABLET, FILM COATED ORAL at 22:26

## 2020-12-04 RX ADMIN — ACETAMINOPHEN 650 MG: 325 TABLET, FILM COATED ORAL at 08:59

## 2020-12-04 NOTE — ROUTINE PROCESS
1927: Assumed care. Quietly resting in bed. HOB elevated. No SOB on oxygen at 2 LPM. Denies any discomfort at this time. Call light within reach. Repositioned. c/o dizziness w/ sudden movement. 2124: Due meds given. Medicated for pain per patient request.     0013: No change from previous assessment. 0200: Sleeping. 5106: No change from previous assessment. 0200: Comfortably sleeping.    0653: Slept good thru night. Needs attended. Incontinence care provided. 0900: Due meds given. 0920:Bedside and Verbal shift change report given to [facility] (oncoming nurse) by me (offgoing nurse).  Report included the following information SBAR, Kardex, Intake/Output, MAR, Recent Results and Cardiac Rhythm SR.

## 2020-12-04 NOTE — PROGRESS NOTES
Patient presents with improved mood, alertness and bed positioning. Patient requesting assist with moving bedside table aside and lowering head of bed. Encouragement provided to participate in skilled OT intervention e.g. grooming tasks of combing hair, oral hygiene. Patient politely refused and stated \"Not today, but how about another day? \" Nursing notified of pt's statement. OT to follow.

## 2020-12-04 NOTE — PROGRESS NOTES
Problem: Falls - Risk of  Goal: *Absence of Falls  Description: Document Majo Rojas Fall Risk and appropriate interventions in the flowsheet. Outcome: Progressing Towards Goal  Note: Fall Risk Interventions:  Mobility Interventions: Bed/chair exit alarm, Communicate number of staff needed for ambulation/transfer, OT consult for ADLs, Patient to call before getting OOB, PT Consult for mobility concerns    Mentation Interventions: Adequate sleep, hydration, pain control, Bed/chair exit alarm, Door open when patient unattended, More frequent rounding, Reorient patient, Room close to nurse's station    Medication Interventions: Bed/chair exit alarm, Patient to call before getting OOB, Evaluate medications/consider consulting pharmacy    Elimination Interventions: Bed/chair exit alarm, Call light in reach    History of Falls Interventions: Bed/chair exit alarm, Door open when patient unattended, Room close to nurse's station         Problem: Patient Education: Go to Patient Education Activity  Goal: Patient/Family Education  Outcome: Progressing Towards Goal     Problem: Pressure Injury - Risk of  Goal: *Prevention of pressure injury  Description: Document Jose Scale and appropriate interventions in the flowsheet.   Outcome: Progressing Towards Goal  Note: Pressure Injury Interventions:  Sensory Interventions: Assess changes in LOC, Assess need for specialty bed, Discuss PT/OT consult with provider, Keep linens dry and wrinkle-free, Maintain/enhance activity level, Minimize linen layers, Pressure redistribution bed/mattress (bed type), Use 30-degree side-lying position    Moisture Interventions: Absorbent underpads, Internal/External urinary devices, Maintain skin hydration (lotion/cream), Minimize layers    Activity Interventions: Pressure redistribution bed/mattress(bed type), PT/OT evaluation    Mobility Interventions: HOB 30 degrees or less, Pressure redistribution bed/mattress (bed type), PT/OT evaluation    Nutrition Interventions: Document food/fluid/supplement intake, Offer support with meals,snacks and hydration    Friction and Shear Interventions: Apply protective barrier, creams and emollients, HOB 30 degrees or less, Minimize layers                Problem: Patient Education: Go to Patient Education Activity  Goal: Patient/Family Education  Outcome: Progressing Towards Goal     Problem: Discharge Planning  Goal: *Discharge to safe environment  Outcome: Progressing Towards Goal     Problem: Hypertension  Goal: *Blood pressure within specified parameters  Outcome: Progressing Towards Goal  Goal: *Fluid volume balance  Outcome: Progressing Towards Goal  Goal: *Labs within defined limits  Outcome: Progressing Towards Goal     Problem: Chronic Obstructive Pulmonary Disease (COPD)  Goal: *Oxygen saturation during activity within specified parameters  Outcome: Progressing Towards Goal  Goal: *Able to remain out of bed as prescribed  Outcome: Progressing Towards Goal  Goal: *Absence of hypoxia  Outcome: Progressing Towards Goal  Goal: *Optimize nutritional status  Outcome: Progressing Towards Goal     Problem: Patient Education: Go to Patient Education Activity  Goal: Patient/Family Education  Outcome: Progressing Towards Goal     Problem: Patient Education: Go to Patient Education Activity  Goal: Patient/Family Education  Outcome: Progressing Towards Goal     Problem: Pain  Goal: *Control of Pain  Outcome: Progressing Towards Goal     Problem: Patient Education: Go to Patient Education Activity  Goal: Patient/Family Education  Outcome: Progressing Towards Goal     Problem: Patient Education: Go to Patient Education Activity  Goal: Patient/Family Education  Outcome: Progressing Towards Goal

## 2020-12-04 NOTE — ROUTINE PROCESS
9577  -- Bedside, Verbal and Written shift change report [facility] ([facility]) by Anatoliy(offgoing nurse). Allergy band placed on pt's wrist. Report included the following information SBAR, Kardex, Intake/Output, MAR and Recent Results. 0830-Assessment completed, call bell within reach, no distress . 0920 -- AM  medications administered, pt tolerated with ease, will continue to monitor. Tylenol given for pain   1200 -- Shift reassessment, pt condition unchanged, will continue to monitor. 1600 --  Shift reassessment, pt condition unchanged, will continue to monitor. 1920 -- Bedside, Verbal and Written shift change report given to Anatoliy(oncoming nurse) by [facility] (offgoing nurse). Report included the following information SBAR, Kardex, Intake/Output, MAR and Recent Results. Skin assessment completed.

## 2020-12-04 NOTE — DIABETES MGMT
Initial Nutrition Assessment    Type and Reason for Visit: Initial  Nutrition Recommendations/Plan:   1. Could consider D5 IVF (blood glucose 57 to 68 mg/dL past 3 mornings)  2. Monitor po intake, labs, weights. Nutrition Assessment:     Pt admitted with hypercalcemia, hyponatremia, hypokalemia metabolic encephalopathy, COPD, h/o lung CA and dementia. Pt is 122% ideal weight. Pt with poor po intake per meal time observation at mealtimes today. Mild hypoglycemia noted past 3 mornings. Pt declines po supplements. Malnutrition Assessment:  Malnutrition Status: At risk for malnutrition (specify)(poor po observed at meal time.)    Estimated Daily Nutrient Needs:  Energy (kcal): 1374; Weight Used for Energy Requirements: Current  Protein (g): 79; Weight Used for Protein Requirements: Current  Fluid (ml/day): 1500; Method Used for Fluid Requirements:    Nutrition History and Allergies: NKFA's. Pt reports her usual weight is 121 lbs. Nutrition Related Findings:  Pt iwth poor positioning at breakfast time and has not yet eaten when seen this morning. Stating her shoulder hurts and she would like to be left alone. Declined po supplements.     Wounds:    None     Current Nutrition Therapies:  DIET REGULAR   Meal intake:   Patient Vitals for the past 168 hrs:   % Diet Eaten   12/02/20 1742 50 %   12/02/20 1318 50 %   12/02/20 0930 0 %   Additional Caloric Sources: none  Anthropometric Measures:  · Height:  4' 11\" (149.9 cm)  · Current Body Wt:  52.5 kg (115 lb 11.9 oz)(12/3/20)       · Usual Body Wt:  54.9 kg (121 lb)     · Ideal Body Wt:  95 lbs:  121.8 %  · BMI Category:  Normal weight (BMI 22.0-24.9) age over 72     Nutrition Diagnosis:   · At nutrition risk related to suspicion for poor po    Nutrition Interventions:   Food and/or Nutrient Delivery: Continue current diet  Nutrition Education and Counseling: No recommendations at this time  Coordination of Nutrition Care: Continue to monitor while inpatient  Goals:  Blood glucose will be in target range (70 to 180 mg/dL) within 3 days. PO intake will meet >75% of patient estimated nutritional needs within the next 7 days. Nutrition Monitoring and Evaluation:   Behavioral-Environmental Outcomes:    Food/Nutrient Intake Outcomes: Food and nutrient intake  Physical Signs/Symptoms Outcomes: Biochemical data, Meal time behavior, Weight  Discharge Planning:     Too soon to determine   Electronically signed by Slim Jenkins RD on 12/4/2020 at 3:09 PM  Contact:   Nate Santiago CDE   Office:  [address] [place] [address] Pager:  [phone]

## 2020-12-04 NOTE — PROGRESS NOTES
Problem: Discharge Planning  Goal: *Discharge to safe environment  Outcome: Progressing Towards Goal    Went ahead and matched to [facility]. Olimpia Holder declined  Called pt POA and discussed discharge plan. She would like pt to return to [facility] and use HH and therapy there. [facility] [address] [facility] [address] and left message for Elenore Rubinstein at facility to contact  [phone]  Called back and voicemail full.    Called again and Elenore Rubinstein had left for weekend  Medina Hudson RN BSN  Outcomes Manager  Office # [phone]  Pager # [phone]

## 2020-12-04 NOTE — PROGRESS NOTES
Internal Medicine Progress Note        NAME: Bobbi Walker   :  1945  MRM:  [id number]    Date/Time: 2020        ASSESSMENT/PLAN:     #  Hypercalcemia (2020). Improved with IVF  Hydration. Continue ivf     Followed labs. No Ca supplements for now   S.ca slightly up still       #  Hyponatremia (2020). Improved. IVF NS . On presentation to ER clinically patient was looking dry .        #  Hypokalemia , Hypo po4, hypo mag. . Replete. Trend.      #  Possible  Metabolic encephalopathy due to Metabolic derangement . Ho dementia on aricept. Unclear base line mentation level   Mentally improved with chemical improvement in her blood test          # Acquired hypothyroidism (2020) on Synthroid. TSH 72 in ER on presentation . Unclear if she is taking her meds regularly. Also been vomiting lately per POA. Possible not taking the medicine correctly ie they give her all meds at same time   Synthroid dose increased slightly   Monitor TSH/FT4 tests     #   COPD (chronic obstructive pulmonary disease) ([address] (4/10/2019). Prn nebs     # ho   Lung cancer . Squamous cell lung cancer on XRT. Records from oncology clinic.   no current chemo or radio- [id number] oncology       # Chronic respiratory failure ([address] (2020) on home o2. Maintain o2     #  HTN (hypertension) (2020). Control BP      # ho  Dementia ([facility] (2020)     # Continue home regimen / Medications when appropriate.      -DVT prophylaxis :  heparin.   - Code Status : DNR    Talked [facility] POA  [phone] . Pt been declining over last 2 weeks. She is not on chemo or radio now. She follows with Dr Shital Howard. Clinical condition and possible prognosis dw poa. Questions answered to apparent satisfaction.      Lab Review:     Recent Labs     20  0425 20  0400 20  1320   WBC 8.2 9.1 11.3   HGB 10.4* 10.3* 12.3   HCT 34.4* 34.6* 40.6    277 273     Recent Labs     20  0410 20  0425 12/02/20  0400 12/01/20  1320   * 133* 136 131*   K 3.6 3.1* 3.5 3.1*   CL 97* 98* 99* 91*   CO2 29 29 30 32   GLU 63* 68* 57* 91   BUN 10 10 15 16   CREA 0.93 0.95 0.97 1.18   CA 10.5* 10.9* 11.8* 13.8*   MG 2.0 1.4* 1.7 1.8   PHOS 2.2* 1.4* 2.1*  --    ALB  --   --  2.4* 2.7*   TBILI  --   --  0.3 0.4   ALT  --   --  7* 8*   INR  --   --  1.1  --      Lab Results   Component Value Date/Time    Glucose (POC) 174 (H) 04/14/2019 09:42 PM    Glucose, POC 92 02/28/2018 10:49 AM     No results for input(s): PH, PCO2, PO2, HCO3, FIO2 in the last 72 hours. Recent Labs     12/02/20  0400   INR 1.1       No results found for: SDES  No results found for: CULT    All Cardiac Markers in the last 24 hours:   No results found for: CPK, CK, CKMMB, CKMB, RCK3, CKMBT, CKNDX, CKND1, SHANTI, TROPT, TROIQ, BARRY, TROPT, TNIPOC, BNP, BNPP        Intervals noted   Pt s/e @ bedside     Subjective:     Chief Complaint:  Offer no complain . Report eating ok . No need for helping her eating. Slept ok. Objective:     Vitals:  Last 24hrs VS reviewed since prior progress note. Most recent are:    Visit Vitals  /73 (BP 1 Location: Left arm, BP Patient Position: At rest;Lying right side)   Pulse 65   Temp 98.3 °F (36.8 °C)   Resp 16   Wt 52.3 kg (115 lb 6.4 oz)   SpO2 96%   BMI 23.31 kg/m²     SpO2 Readings from Last 6 Encounters:   12/04/20 96%   11/18/20 95%   11/13/20 100%   09/17/19 98%   04/16/19 95%   02/28/18 97%    O2 Flow Rate (L/min): 2 l/min       Intake/Output Summary (Last 24 hours) at 12/4/2020 1129  Last data filed at 12/4/2020 0859  Gross per 24 hour   Intake 2400 ml   Output 1100 ml   Net 1300 ml          Physical Exam:   Gen:  Appear stated age, Well-developed,   in no acute distress  HEENT: upper and lower dentures,  Head atraumatic, normocephalic , hearing intact to voice, moist  mucous membranes.   Neck:   Trachea midline , No apparent JVD, Supple,  thyroid non-tender  Resp:    No accessory muscle use,Bilateral BS present, clear breath sounds without wheezes rales or rhonchi  Card:   normal S1, S2 without Gallop . No Significant lower leg peripheral edema. Abd:  Soft, non-tender, non-distended, bowel sounds are present    Musc: hard bony mass L upper back at L scapula region about 5 cm,  No cyanosis or clubbing. Skin:   Warm and dry . No rashes or ulcers   Neuro: AXOX3 , Coherent. No  facial asymmetry,  no clear area of focal motor weakness     alert.     Medications Reviewed: (see below)    Lab Data Reviewed: (see below)    ______________________________________________________________________    Medications:     Current Facility-Administered Medications   Medication Dose Route Frequency    potassium, sodium phosphates (NEUTRA-PHOS) packet 2 Packet  2 Packet Oral QID    levothyroxine (SYNTHROID) tablet 150 mcg  150 mcg Oral ACB    albuterol-ipratropium (DUO-NEB) 2.5 MG-0.5 MG/3 ML  3 mL Nebulization Q4H PRN    amLODIPine (NORVASC) tablet 10 mg  10 mg Oral DAILY    aspirin chewable tablet 81 mg  81 mg Oral DAILY    atorvastatin (LIPITOR) tablet 40 mg  40 mg Oral DAILY    [START ON 12/7/2020] ergocalciferol capsule 50,000 Units  50,000 Units Oral every Monday    citalopram (CELEXA) tablet 20 mg  20 mg Oral DAILY    donepeziL (ARICEPT) tablet 5 mg  5 mg Oral QHS    fluticasone propionate (FLONASE) 50 mcg/actuation nasal spray 2 Spray  2 Spray Both Nostrils DAILY    lamoTRIgine (LaMICtal) tablet 100 mg  100 mg Oral DAILY    folic acid (FOLVITE) tablet 1 mg  1 mg Oral DAILY    lisinopriL (PRINIVIL, ZESTRIL) tablet 20 mg  20 mg Oral DAILY    montelukast (SINGULAIR) tablet 10 mg  10 mg Oral QHS    pantoprazole (PROTONIX) tablet 40 mg  40 mg Oral ACB    0.9% sodium chloride infusion  75 mL/hr IntraVENous CONTINUOUS    sodium chloride (NS) flush 5-40 mL  5-40 mL IntraVENous Q8H    sodium chloride (NS) flush 5-40 mL  5-40 mL IntraVENous PRN    acetaminophen (TYLENOL) tablet 650 mg  650 mg Oral Q6H PRN    Or    acetaminophen (TYLENOL) suppository 650 mg  650 mg Rectal Q6H PRN    polyethylene glycol (MIRALAX) packet 17 g  17 g Oral DAILY PRN    promethazine (PHENERGAN) tablet 12.5 mg  12.5 mg Oral Q6H PRN    Or    ondansetron (ZOFRAN) injection 4 mg  4 mg IntraVENous Q6H PRN    heparin (porcine) injection 5,000 Units  5,000 Units SubCUTAneous Q8H    arformoteroL (BROVANA) neb solution 15 mcg  15 mcg Nebulization BID RT    budesonide (PULMICORT) 500 mcg/2 ml nebulizer suspension  500 mcg Nebulization BID RT        Total time spent with patient: 35 minutes                  Care Plan discussed with: Care Manager, Nursing Staff and >50% of time spent in counseling and coordination of care      Discussed:  Care Plan              This document in whole or part of it has been produced using voice recognition software. Unrecognized errors in transcription may be present.     Attending Physician: Geraldine Lopez MD

## 2020-12-04 NOTE — PROGRESS NOTES
Problem: Mobility Impaired (Adult and Pediatric)  Goal: *Acute Goals and Plan of Care (Insert Text)  Description: Physical Therapy Goals  Initiated 12/3/2020 and to be accomplished within 7 day(s)  1. Patient will move from supine to sit and sit to supine  in bed with minimal assistance/contact guard assist.    2.  Patient will transfer from bed to chair and chair to bed with minimal assistance/contact guard assist using the least restrictive device. 3.  Patient will perform sit to stand with minimal assistance/contact guard assist.  4.  Patient will ambulate with minimal assistance/contact guard assist for 50 feet with the least restrictive device. Outcome: Progressing Towards Goal  PHYSICAL THERAPY TREATMENT    Patient: Kimberli White (45 y.o. female)  Date: 12/4/2020  Diagnosis: Hypercalcemia [E83.52]   Hypercalcemia  Precautions: Fall, Skin  PLOF: Mod I; Assisted Living Facility  ASSESSMENT:  Declines EOB despite max education and encouragement. Oriented to self only. Reports right shoulder pain; ongoing with admission. Elevated on pillow at end of session; patient unable to tolerate. Unable to provide history of pain or find positions of comfort/pain. Performed BLE AROM with max cues and encouragement. Poor attention and ends participation prior to 10 reps. Cues to engage to meet 6-7 reps. Repositions self in bed spontaneously with supervision for roll d/t lines. Educated on need for RN assistance with mobility; verbalized understanding. Call bell in reach. Progression toward goals:   []      Improving appropriately and progressing toward goals  [x]      Improving slowly and progressing toward goals  []      Not making progress toward goals and plan of care will be adjusted     PLAN:  Patient continues to benefit from skilled intervention to address the above impairments. Continue treatment per established plan of care.   Discharge Recommendations:  [address] Recommendations for Discharge:  TBD with OOB     SUBJECTIVE:   Patient stated What did Min Fink say?     OBJECTIVE DATA SUMMARY:   Critical Behavior:  Neurologic State: Alert;Confused  Orientation Level: Oriented to person;Disoriented to time;Disoriented to place; Disoriented to situation  Cognition: Impaired decision making     Psychosocial  Patient Behaviors: Calm  Functional Mobility:  Bed Mobility:  Rolling: Supervision  Scooting: Total assistance;Assist x2  Therapeutic Exercises:   BLE AROM supine; ankle pumps, knee flexion, hip abduction x6-7    Pain:  Pain level pre-treatment: 10/10  Pain level post-treatment: 10/10     Activity Tolerance:   Poor     After treatment:   [] Patient left in no apparent distress sitting up in chair  [x] Patient left in no apparent distress in bed  [x] Call bell left within reach  [x] Nursing notified  [] Caregiver present  [] Bed alarm activated  [] SCDs applied      COMMUNICATION/EDUCATION:   [x]         Role of physical therapy in the acute care setting. [x]         Fall prevention education was provided and the patient/caregiver indicated understanding. [x]         Patient/family have participated as able in working toward goals and plan of care. [x]         Patient/family agree to work toward stated goals and plan of care. []         Patient understands intent and goals of therapy, but is neutral about his/her participation. []         Patient is unable to participate in stated goals/plan of care: ongoing with therapy staff.       Carmen Patrick, PT   Time Calculation: 10 mins

## 2020-12-04 NOTE — PROGRESS NOTES
Problem: Chronic Obstructive Pulmonary Disease (COPD)  Goal: *Oxygen saturation during activity within specified parameters  Outcome: Progressing Towards Goal  Goal: *Absence of hypoxia  Outcome: Progressing Towards Goal   Respiratory Therapy Assessment Care Plan    Patient:  Nahum Diaz 66 y.o. female 12/4/2020 8:22 AM    Hypercalcemia [E83.52]      Chest X-RAY:   Results from Hospital Encounter encounter on 12/01/20   XR SHOULDER RT AP/LAT MIN 2 V    Impression IMPRESSION:    Small subacromial enthesophyte which could contribute to the clinical  impingement syndrome. Greater humeral tuberosity subchondral cysts, a secondary  sign of possible rotator cuff pathology. XR CHEST PORT    Impression Impression:    No acute cardiopulmonary disease. Nonspecific medial right lung base opacity,  elevated diaphragm or possible hernia and less likely mass. Results from [place] encounter on 11/18/20   XR SHOULDER RT AP/LAT MIN 2 V    Impression IMPRESSION:  No acute abnormality of the right shoulder.             Vital Signs:   Visit Vitals  /73 (BP 1 Location: Left arm, BP Patient Position: At rest;Lying right side)   Pulse 65   Temp 98.3 °F (36.8 °C)   Resp 16   Wt 52.5 kg (115 lb 11.2 oz)   SpO2 96%   BMI 23.37 kg/m²         Indications for treatment: COPD, resp failure      Plan of care: Pulmicort/Brovana BID, supplemental 02        Goal: Achieve adequate oxygenation/aeration to improve ADL's via decreasing WOB for d/c to home setting

## 2020-12-05 LAB
ALBUMIN SERPL-MCNC: 2.6 G/DL (ref 3.4–5)
ALBUMIN/GLOB SERPL: 0.7 {RATIO} (ref 0.8–1.7)
ALP SERPL-CCNC: 115 U/L (ref 45–117)
ALT SERPL-CCNC: 9 U/L (ref 13–56)
ANION GAP SERPL CALC-SCNC: 8 MMOL/L (ref 3–18)
AST SERPL-CCNC: 17 U/L (ref 10–38)
BILIRUB DIRECT SERPL-MCNC: 0.1 MG/DL (ref 0–0.2)
BILIRUB SERPL-MCNC: 0.3 MG/DL (ref 0.2–1)
BUN SERPL-MCNC: 9 MG/DL (ref 7–18)
BUN/CREAT SERPL: 11 (ref 12–20)
CALCIUM SERPL-MCNC: 9.3 MG/DL (ref 8.5–10.1)
CHLORIDE SERPL-SCNC: 96 MMOL/L (ref 100–111)
CO2 SERPL-SCNC: 26 MMOL/L (ref 21–32)
CREAT SERPL-MCNC: 0.84 MG/DL (ref 0.6–1.3)
GLOBULIN SER CALC-MCNC: 3.9 G/DL (ref 2–4)
GLUCOSE SERPL-MCNC: 62 MG/DL (ref 74–99)
INR PPP: 1 (ref 0.8–1.2)
MAGNESIUM SERPL-MCNC: 1.4 MG/DL (ref 1.6–2.6)
PHOSPHATE SERPL-MCNC: 3 MG/DL (ref 2.5–4.9)
POTASSIUM SERPL-SCNC: 3.4 MMOL/L (ref 3.5–5.5)
PROT SERPL-MCNC: 6.5 G/DL (ref 6.4–8.2)
PROTHROMBIN TIME: 12.6 SEC (ref 11.5–15.2)
SODIUM SERPL-SCNC: 130 MMOL/L (ref 136–145)

## 2020-12-05 PROCEDURE — 84100 ASSAY OF PHOSPHORUS: CPT

## 2020-12-05 PROCEDURE — 74011250637 HC RX REV CODE- 250/637: Performed by: INTERNAL MEDICINE

## 2020-12-05 PROCEDURE — 83735 ASSAY OF MAGNESIUM: CPT

## 2020-12-05 PROCEDURE — 77030038269 HC DRN EXT URIN PURWCK BARD -A

## 2020-12-05 PROCEDURE — 85610 PROTHROMBIN TIME: CPT

## 2020-12-05 PROCEDURE — 94640 AIRWAY INHALATION TREATMENT: CPT

## 2020-12-05 PROCEDURE — 77010033678 HC OXYGEN DAILY

## 2020-12-05 PROCEDURE — 74011250636 HC RX REV CODE- 250/636: Performed by: INTERNAL MEDICINE

## 2020-12-05 PROCEDURE — 80076 HEPATIC FUNCTION PANEL: CPT

## 2020-12-05 PROCEDURE — 80048 BASIC METABOLIC PNL TOTAL CA: CPT

## 2020-12-05 PROCEDURE — 36415 COLL VENOUS BLD VENIPUNCTURE: CPT

## 2020-12-05 PROCEDURE — 94761 N-INVAS EAR/PLS OXIMETRY MLT: CPT

## 2020-12-05 PROCEDURE — 74011000250 HC RX REV CODE- 250: Performed by: INTERNAL MEDICINE

## 2020-12-05 PROCEDURE — 65270000029 HC RM PRIVATE

## 2020-12-05 PROCEDURE — 2709999900 HC NON-CHARGEABLE SUPPLY

## 2020-12-05 RX ORDER — MAGNESIUM SULFATE HEPTAHYDRATE 40 MG/ML
2 INJECTION, SOLUTION INTRAVENOUS ONCE
Status: COMPLETED | OUTPATIENT
Start: 2020-12-05 | End: 2020-12-05

## 2020-12-05 RX ORDER — POTASSIUM CHLORIDE 20 MEQ/1
40 TABLET, EXTENDED RELEASE ORAL
Status: COMPLETED | OUTPATIENT
Start: 2020-12-05 | End: 2020-12-05

## 2020-12-05 RX ADMIN — LEVOTHYROXINE SODIUM 150 MCG: 0.07 TABLET ORAL at 07:01

## 2020-12-05 RX ADMIN — BUDESONIDE 500 MCG: 0.5 INHALANT RESPIRATORY (INHALATION) at 21:02

## 2020-12-05 RX ADMIN — LISINOPRIL 20 MG: 20 TABLET ORAL at 10:26

## 2020-12-05 RX ADMIN — DONEPEZIL HYDROCHLORIDE 5 MG: 5 TABLET, FILM COATED ORAL at 22:22

## 2020-12-05 RX ADMIN — ARFORMOTEROL TARTRATE 15 MCG: 15 SOLUTION RESPIRATORY (INHALATION) at 21:02

## 2020-12-05 RX ADMIN — Medication 10 ML: at 06:00

## 2020-12-05 RX ADMIN — CITALOPRAM HYDROBROMIDE 20 MG: 20 TABLET ORAL at 10:27

## 2020-12-05 RX ADMIN — MONTELUKAST 10 MG: 10 TABLET, FILM COATED ORAL at 22:22

## 2020-12-05 RX ADMIN — AMLODIPINE BESYLATE 10 MG: 10 TABLET ORAL at 10:27

## 2020-12-05 RX ADMIN — MAGNESIUM SULFATE HEPTAHYDRATE 2 G: 40 INJECTION, SOLUTION INTRAVENOUS at 12:31

## 2020-12-05 RX ADMIN — Medication 10 ML: at 16:55

## 2020-12-05 RX ADMIN — Medication 10 ML: at 22:23

## 2020-12-05 RX ADMIN — ATORVASTATIN CALCIUM 40 MG: 20 TABLET, FILM COATED ORAL at 10:26

## 2020-12-05 RX ADMIN — FOLIC ACID 1 MG: 1 TABLET ORAL at 10:26

## 2020-12-05 RX ADMIN — POTASSIUM CHLORIDE 40 MEQ: 1500 TABLET, EXTENDED RELEASE ORAL at 16:55

## 2020-12-05 RX ADMIN — LAMOTRIGINE 100 MG: 100 TABLET ORAL at 10:27

## 2020-12-05 RX ADMIN — HEPARIN SODIUM 5000 UNITS: 5000 INJECTION INTRAVENOUS; SUBCUTANEOUS at 12:36

## 2020-12-05 RX ADMIN — POTASSIUM CHLORIDE 40 MEQ: 1500 TABLET, EXTENDED RELEASE ORAL at 10:27

## 2020-12-05 RX ADMIN — HEPARIN SODIUM 5000 UNITS: 5000 INJECTION INTRAVENOUS; SUBCUTANEOUS at 22:22

## 2020-12-05 RX ADMIN — BUDESONIDE 500 MCG: 0.5 INHALANT RESPIRATORY (INHALATION) at 09:00

## 2020-12-05 RX ADMIN — ACETAMINOPHEN 650 MG: 325 TABLET, FILM COATED ORAL at 02:38

## 2020-12-05 RX ADMIN — ASPIRIN 81 MG: 81 TABLET, CHEWABLE ORAL at 10:28

## 2020-12-05 RX ADMIN — PANTOPRAZOLE SODIUM 40 MG: 40 TABLET, DELAYED RELEASE ORAL at 10:26

## 2020-12-05 RX ADMIN — HEPARIN SODIUM 5000 UNITS: 5000 INJECTION INTRAVENOUS; SUBCUTANEOUS at 04:02

## 2020-12-05 RX ADMIN — FLUTICASONE PROPIONATE 2 SPRAY: 50 SPRAY, METERED NASAL at 10:33

## 2020-12-05 RX ADMIN — ARFORMOTEROL TARTRATE 15 MCG: 15 SOLUTION RESPIRATORY (INHALATION) at 09:00

## 2020-12-05 RX ADMIN — ACETAMINOPHEN 650 MG: 325 TABLET, FILM COATED ORAL at 17:35

## 2020-12-05 NOTE — PROGRESS NOTES
Problem: Falls - Risk of  Goal: *Absence of Falls  Description: Document Fawn Crenshaw Fall Risk and appropriate interventions in the flowsheet. Outcome: Progressing Towards Goal  Note: Fall Risk Interventions:  Mobility Interventions: Bed/chair exit alarm, Communicate number of staff needed for ambulation/transfer, Patient to call before getting OOB, PT Consult for mobility concerns    Mentation Interventions: Adequate sleep, hydration, pain control    Medication Interventions: Bed/chair exit alarm, Patient to call before getting OOB, Teach patient to arise slowly    Elimination Interventions: Bed/chair exit alarm, Call light in reach, Patient to call for help with toileting needs    History of Falls Interventions: Bed/chair exit alarm, Investigate reason for fall, Door open when patient unattended         Problem: Patient Education: Go to Patient Education Activity  Goal: Patient/Family Education  Outcome: Progressing Towards Goal     Problem: Pressure Injury - Risk of  Goal: *Prevention of pressure injury  Description: Document Jose Scale and appropriate interventions in the flowsheet.   Outcome: Progressing Towards Goal  Note: Pressure Injury Interventions:  Sensory Interventions: Assess changes in LOC, Discuss PT/OT consult with provider, Maintain/enhance activity level    Moisture Interventions: Absorbent underpads    Activity Interventions: Pressure redistribution bed/mattress(bed type), PT/OT evaluation    Mobility Interventions: HOB 30 degrees or less, Pressure redistribution bed/mattress (bed type), PT/OT evaluation    Nutrition Interventions: Document food/fluid/supplement intake    Friction and Shear Interventions: Apply protective barrier, creams and emollients                Problem: Patient Education: Go to Patient Education Activity  Goal: Patient/Family Education  Outcome: Progressing Towards Goal     Problem: Discharge Planning  Goal: *Discharge to safe environment  Outcome: Progressing Towards Goal Problem: Hypertension  Goal: *Blood pressure within specified parameters  Outcome: Progressing Towards Goal  Goal: *Fluid volume balance  Outcome: Progressing Towards Goal  Goal: *Labs within defined limits  Outcome: Progressing Towards Goal     Problem: Chronic Obstructive Pulmonary Disease (COPD)  Goal: *Oxygen saturation during activity within specified parameters  Outcome: Progressing Towards Goal  Goal: *Able to remain out of bed as prescribed  Outcome: Progressing Towards Goal  Goal: *Absence of hypoxia  Outcome: Progressing Towards Goal  Goal: *Optimize nutritional status  Outcome: Progressing Towards Goal     Problem: Patient Education: Go to Patient Education Activity  Goal: Patient/Family Education  Outcome: Progressing Towards Goal     Problem: Patient Education: Go to Patient Education Activity  Goal: Patient/Family Education  Outcome: Progressing Towards Goal     Problem: Pain  Goal: *Control of Pain  Outcome: Progressing Towards Goal     Problem: Patient Education: Go to Patient Education Activity  Goal: Patient/Family Education  Outcome: Progressing Towards Goal     Problem: Patient Education: Go to Patient Education Activity  Goal: Patient/Family Education  Outcome: Progressing Towards Goal     Problem: Patient Education: Go to Patient Education Activity  Goal: Patient/Family Education  Outcome: Progressing Towards Goal     Problem: Discharge Planning  Goal: *Discharge to safe environment  Outcome: Progressing Towards Goal     Problem: Hypertension  Goal: *Blood pressure within specified parameters  Outcome: Progressing Towards Goal     Problem: Patient Education: Go to Patient Education Activity  Goal: Patient/Family Education  Outcome: Progressing Towards Goal     Problem: Patient Education: Go to Patient Education Activity  Goal: Patient/Family Education  Outcome: Progressing Towards Goal     Problem: Pain  Goal: *Control of Pain  Outcome: Progressing Towards Goal

## 2020-12-05 NOTE — PROGRESS NOTES
Problem: Falls - Risk of  Goal: *Absence of Falls  Description: Document David Tera Fall Risk and appropriate interventions in the flowsheet. Outcome: Progressing Towards Goal  Note: Fall Risk Interventions:  Mobility Interventions: Patient to call before getting OOB    Mentation Interventions: Adequate sleep, hydration, pain control, Door open when patient unattended    Medication Interventions: Bed/chair exit alarm    Elimination Interventions: Patient to call for help with toileting needs, Bed/chair exit alarm    History of Falls Interventions: Bed/chair exit alarm         Problem: Pressure Injury - Risk of  Goal: *Prevention of pressure injury  Description: Document Jose Scale and appropriate interventions in the flowsheet.   Outcome: Progressing Towards Goal  Note: Pressure Injury Interventions:  Sensory Interventions: Assess changes in LOC    Moisture Interventions: Absorbent underpads    Activity Interventions: Pressure redistribution bed/mattress(bed type)    Mobility Interventions: Pressure redistribution bed/mattress (bed type)    Nutrition Interventions: Document food/fluid/supplement intake    Friction and Shear Interventions: Apply protective barrier, creams and emollients                Problem: Pain  Goal: *Control of Pain  Outcome: Progressing Towards Goal

## 2020-12-05 NOTE — PROGRESS NOTES
Problem: Discharge Planning  Goal: *Discharge to safe environment  Outcome: Progressing Towards Goal    POA wants patient to return to Assisted Living and put her on the higher level of care unit with therapy Supervisor for readmission to [facility] at [phone] not at facility till Monday  [facility] to see if anyone else available on weekend and there is not

## 2020-12-05 NOTE — PROGRESS NOTES
Internal Medicine Progress Note        NAME: Spencer Brandt   :  1945  MRM:  [id number]    Date/Time: 2020        ASSESSMENT/PLAN: D/C SNF per PT      #  Hypercalcemia (2020). Improved with IVF  Hydration. Followed labs. No Ca supplements for now   S.ca normalized      #  Hyponatremia (2020). Improved. IVF NS . On presentation to ER, clinically patient was looking dry .        #  Hypokalemia , Hypo po4, hypo mag. . Replete. Trend.      #  Possible  Metabolic encephalopathy due to Metabolic derangement . Ho dementia on aricept. Unclear base line mentation level   Mentally improved , seem at her base line           # Acquired hypothyroidism (2020) on Synthroid. TSH 72 in ER on presentation . Unclear if she is taking her meds regularly. Also been vomiting lately per POA. Possible not taking the medicine correctly ie they give her all meds at same time   Synthroid dose increased slightly   Monitor TSH/FT4 tests     #   COPD (chronic obstructive pulmonary disease) ([facility] [address] (4/10/2019). Prn nebs     # ho   Lung cancer . Squamous cell lung cancer on XRT. Records from oncology clinic.   no current chemo or radio- [id number] oncology       # Chronic respiratory failure ([facility] [address] (2020) on home o2. Maintain o2     #  HTN (hypertension) (2020).  Control BP      # ho  Dementia ([facility] [address] (2020)     # Continue home regimen / Medications when appropriate.      -DVT prophylaxis :  heparin.   - Code Status : DNR       Lab Review:     Recent Labs     20  0425   WBC 8.2   HGB 10.4*   HCT 34.4*        Recent Labs     20  0235 20  0410 20  0425   * 133* 133*   K 3.4* 3.6 3.1*   CL 96* 97* 98*   CO2 26 29 29   GLU 62* 63* 68*   BUN 9 10 10   CREA 0.84 0.93 0.95   CA 9.3 10.5* 10.9*   MG 1.4* 2.0 1.4*   PHOS 3.0 2.2* 1.4*   ALB 2.6*  --   --    TBILI 0.3  --   --    ALT 9*  --   --    INR 1.0  --   --      Lab Results   Component Value Date/Time    Glucose (POC) 174 (H) 04/14/2019 09:42 PM    Glucose, POC 92 02/28/2018 10:49 AM     No results for input(s): PH, PCO2, PO2, HCO3, FIO2 in the last 72 hours. Recent Labs     12/05/20  0235   INR 1.0       No results found for: SDES  No results found for: CULT    All Cardiac Markers in the last 24 hours:   No results found for: CPK, CK, CKMMB, CKMB, RCK3, CKMBT, CKNDX, CKND1, SHANTI, TROPT, TROIQ, BARRY, TROPT, TNIPOC, BNP, BNPP        Intervals noted   Pt s/e @ bedside     Subjective:     Chief Complaint:  Offer no complain . Report feeling and eating ok . Slept ok. Objective:     Vitals:  Last 24hrs VS reviewed since prior progress note. Most recent are:    Visit Vitals  /67 (BP 1 Location: Left arm, BP Patient Position: At rest)   Pulse 60   Temp 98.2 °F (36.8 °C)   Resp 18   Ht 4' 11\" (1.499 m)   Wt 52.3 kg (115 lb 6.4 oz)   SpO2 100%   BMI 23.31 kg/m²     SpO2 Readings from Last 6 Encounters:   12/05/20 100%   11/18/20 95%   11/13/20 100%   09/17/19 98%   04/16/19 95%   02/28/18 97%    O2 Flow Rate (L/min): 2 l/min       Intake/Output Summary (Last 24 hours) at 12/5/2020 1049  Last data filed at 12/5/2020 2625  Gross per 24 hour   Intake 600 ml   Output 1500 ml   Net -900 ml          Physical Exam:   Gen:  Appear stated age, Well-developed,   in no acute distress  HEENT: upper and lower dentures,  Head atraumatic, normocephalic , hearing intact to voice, moist  mucous membranes. Neck:   Trachea midline , No apparent JVD, Supple,  thyroid non-tender  Resp:    No accessory muscle use,Bilateral BS present, clear breath sounds without wheezes rales or rhonchi  Card:   normal S1, S2 without Gallop . No Significant lower leg peripheral edema. Abd:  Soft, non-tender, non-distended, bowel sounds are present    Musc: hard bony mass L upper back at L scapula region about 5 cm,  No cyanosis or clubbing. Skin:   Warm and dry . No rashes or ulcers   Neuro: AXOX3 , Coherent.    No  facial asymmetry,  no clear area of focal motor weakness     alert.     Medications Reviewed: (see below)    Lab Data Reviewed: (see below)    ______________________________________________________________________    Medications:     Current Facility-Administered Medications   Medication Dose Route Frequency    potassium chloride SR (KLOR-CON 10) tablet 40 mEq  40 mEq Oral NOW    levothyroxine (SYNTHROID) tablet 150 mcg  150 mcg Oral ACB    albuterol-ipratropium (DUO-NEB) 2.5 MG-0.5 MG/3 ML  3 mL Nebulization Q4H PRN    amLODIPine (NORVASC) tablet 10 mg  10 mg Oral DAILY    aspirin chewable tablet 81 mg  81 mg Oral DAILY    atorvastatin (LIPITOR) tablet 40 mg  40 mg Oral DAILY    [START ON 12/7/2020] ergocalciferol capsule 50,000 Units  50,000 Units Oral every Monday    citalopram (CELEXA) tablet 20 mg  20 mg Oral DAILY    donepeziL (ARICEPT) tablet 5 mg  5 mg Oral QHS    fluticasone propionate (FLONASE) 50 mcg/actuation nasal spray 2 Spray  2 Spray Both Nostrils DAILY    lamoTRIgine (LaMICtal) tablet 100 mg  100 mg Oral DAILY    folic acid (FOLVITE) tablet 1 mg  1 mg Oral DAILY    lisinopriL (PRINIVIL, ZESTRIL) tablet 20 mg  20 mg Oral DAILY    montelukast (SINGULAIR) tablet 10 mg  10 mg Oral QHS    pantoprazole (PROTONIX) tablet 40 mg  40 mg Oral ACB    sodium chloride (NS) flush 5-40 mL  5-40 mL IntraVENous Q8H    sodium chloride (NS) flush 5-40 mL  5-40 mL IntraVENous PRN    acetaminophen (TYLENOL) tablet 650 mg  650 mg Oral Q6H PRN    Or    acetaminophen (TYLENOL) suppository 650 mg  650 mg Rectal Q6H PRN    polyethylene glycol (MIRALAX) packet 17 g  17 g Oral DAILY PRN    promethazine (PHENERGAN) tablet 12.5 mg  12.5 mg Oral Q6H PRN    Or    ondansetron (ZOFRAN) injection 4 mg  4 mg IntraVENous Q6H PRN    heparin (porcine) injection 5,000 Units  5,000 Units SubCUTAneous Q8H    arformoteroL (BROVANA) neb solution 15 mcg  15 mcg Nebulization BID RT    budesonide (PULMICORT) 500 mcg/2 ml nebulizer suspension  500 mcg Nebulization BID RT        Total time spent with patient: 25 minutes                  Care Plan discussed with: Care Manager, Nursing Staff and >50% of time spent in counseling and coordination of care      Discussed:  Care Plan              This document in whole or part of it has been produced using voice recognition software. Unrecognized errors in transcription may be present.     Attending Physician: Vinayak Ceballos MD

## 2020-12-05 NOTE — PROGRESS NOTES
[address] [place] care of pt from [facility]99, RN. Pt in bed resting alert and oriented x2 denies pain at the moment. Assessement completed plan of care for the shift explained pt verbalized understanding. IVF infusing well, pt has purewick in place draining well. HOB elevated, bed low and in locked position. Call light and frequently used items within reach. 2888- Pt given tylenol for shoulder pain   0338- Pt sleeping. Pt incontinent of urine and bladder care done bed linen changed pt made comfortable in bed.   0630- Pt incontinent of stool/ urine care done purewick changed pt made comfortable in bed. Bed low and in locked position. 0730-Bedside and Verbal shift change report given to  Jason Nunez (oncoming nurse) by Rafael Corral RN (offgoing nurse). Report included the following information SBAR, Kardex, Intake/Output, MAR and Recent Results.

## 2020-12-05 NOTE — ROUTINE PROCESS
0920-- Bedside, Verbal and Written shift change report received by Emily Batres (oncoming nurse) by Santiago Velasquez (offgoing nurse). Allergy band placed on pt's wrist. Report included the following information SBAR, Kardex, Intake/Output, MAR and Recent Results. 0930-Assessment completed,call bell within reach, no distress noted. will continue to monitor. 1200 -- Shift reassessment, pt condition unchanged, will continue to monitor. 1600 --  Shift reassessment, pt condition unchanged, will continue to monitor. 1940-- Bedside, Verbal and Written shift change report given to Sr. Shelton (oncoming nurse) by Emily Batres (offgoing nurse). Report included the following information SBAR, Kardex, Intake/Output, MAR and Recent Results. Skin assessment completed.

## 2020-12-06 LAB
ANION GAP SERPL CALC-SCNC: 9 MMOL/L (ref 3–18)
BUN SERPL-MCNC: 10 MG/DL (ref 7–18)
BUN/CREAT SERPL: 11 (ref 12–20)
CALCIUM SERPL-MCNC: 8.9 MG/DL (ref 8.5–10.1)
CHLORIDE SERPL-SCNC: 99 MMOL/L (ref 100–111)
CO2 SERPL-SCNC: 24 MMOL/L (ref 21–32)
COVID-19 RAPID TEST, COVR: NOT DETECTED
CREAT SERPL-MCNC: 0.88 MG/DL (ref 0.6–1.3)
ERYTHROCYTE [DISTWIDTH] IN BLOOD BY AUTOMATED COUNT: 17.8 % (ref 11.6–14.5)
GLUCOSE SERPL-MCNC: 72 MG/DL (ref 74–99)
HCT VFR BLD AUTO: 33.7 % (ref 35–45)
HEALTH STATUS, XMCV2T: NORMAL
HGB BLD-MCNC: 10.5 G/DL (ref 12–16)
MAGNESIUM SERPL-MCNC: 2.1 MG/DL (ref 1.6–2.6)
MCH RBC QN AUTO: 25.8 PG (ref 24–34)
MCHC RBC AUTO-ENTMCNC: 31.2 G/DL (ref 31–37)
MCV RBC AUTO: 82.8 FL (ref 74–97)
PHOSPHATE SERPL-MCNC: 2.1 MG/DL (ref 2.5–4.9)
PLATELET # BLD AUTO: 322 K/UL (ref 135–420)
PMV BLD AUTO: 10.2 FL (ref 9.2–11.8)
POTASSIUM SERPL-SCNC: 4.4 MMOL/L (ref 3.5–5.5)
RBC # BLD AUTO: 4.07 M/UL (ref 4.2–5.3)
SODIUM SERPL-SCNC: 132 MMOL/L (ref 136–145)
SOURCE, COVRS: NORMAL
SPECIMEN TYPE, XMCV1T: NORMAL
WBC # BLD AUTO: 10.6 K/UL (ref 4.6–13.2)

## 2020-12-06 PROCEDURE — 74011250636 HC RX REV CODE- 250/636: Performed by: INTERNAL MEDICINE

## 2020-12-06 PROCEDURE — 85027 COMPLETE CBC AUTOMATED: CPT

## 2020-12-06 PROCEDURE — 65270000029 HC RM PRIVATE

## 2020-12-06 PROCEDURE — 2709999900 HC NON-CHARGEABLE SUPPLY

## 2020-12-06 PROCEDURE — 74011000250 HC RX REV CODE- 250: Performed by: INTERNAL MEDICINE

## 2020-12-06 PROCEDURE — 84100 ASSAY OF PHOSPHORUS: CPT

## 2020-12-06 PROCEDURE — 94640 AIRWAY INHALATION TREATMENT: CPT

## 2020-12-06 PROCEDURE — 83735 ASSAY OF MAGNESIUM: CPT

## 2020-12-06 PROCEDURE — 80048 BASIC METABOLIC PNL TOTAL CA: CPT

## 2020-12-06 PROCEDURE — 94761 N-INVAS EAR/PLS OXIMETRY MLT: CPT

## 2020-12-06 PROCEDURE — 87635 SARS-COV-2 COVID-19 AMP PRB: CPT

## 2020-12-06 PROCEDURE — 36415 COLL VENOUS BLD VENIPUNCTURE: CPT

## 2020-12-06 PROCEDURE — 74011250637 HC RX REV CODE- 250/637: Performed by: INTERNAL MEDICINE

## 2020-12-06 PROCEDURE — 77010033678 HC OXYGEN DAILY

## 2020-12-06 RX ADMIN — LISINOPRIL 20 MG: 20 TABLET ORAL at 08:43

## 2020-12-06 RX ADMIN — Medication 10 ML: at 21:06

## 2020-12-06 RX ADMIN — FOLIC ACID 1 MG: 1 TABLET ORAL at 08:43

## 2020-12-06 RX ADMIN — ARFORMOTEROL TARTRATE 15 MCG: 15 SOLUTION RESPIRATORY (INHALATION) at 20:01

## 2020-12-06 RX ADMIN — BUDESONIDE 500 MCG: 0.5 INHALANT RESPIRATORY (INHALATION) at 10:15

## 2020-12-06 RX ADMIN — AMLODIPINE BESYLATE 10 MG: 10 TABLET ORAL at 08:43

## 2020-12-06 RX ADMIN — ARFORMOTEROL TARTRATE 15 MCG: 15 SOLUTION RESPIRATORY (INHALATION) at 10:15

## 2020-12-06 RX ADMIN — ASPIRIN 81 MG: 81 TABLET, CHEWABLE ORAL at 08:43

## 2020-12-06 RX ADMIN — HEPARIN SODIUM 5000 UNITS: 5000 INJECTION INTRAVENOUS; SUBCUTANEOUS at 21:05

## 2020-12-06 RX ADMIN — ATORVASTATIN CALCIUM 40 MG: 20 TABLET, FILM COATED ORAL at 08:43

## 2020-12-06 RX ADMIN — Medication 10 ML: at 05:42

## 2020-12-06 RX ADMIN — LEVOTHYROXINE SODIUM 150 MCG: 0.07 TABLET ORAL at 05:41

## 2020-12-06 RX ADMIN — DONEPEZIL HYDROCHLORIDE 5 MG: 5 TABLET, FILM COATED ORAL at 21:12

## 2020-12-06 RX ADMIN — PANTOPRAZOLE SODIUM 40 MG: 40 TABLET, DELAYED RELEASE ORAL at 08:43

## 2020-12-06 RX ADMIN — LAMOTRIGINE 100 MG: 100 TABLET ORAL at 08:43

## 2020-12-06 RX ADMIN — CITALOPRAM HYDROBROMIDE 20 MG: 20 TABLET ORAL at 08:43

## 2020-12-06 RX ADMIN — Medication 10 ML: at 13:46

## 2020-12-06 RX ADMIN — FLUTICASONE PROPIONATE 2 SPRAY: 50 SPRAY, METERED NASAL at 08:45

## 2020-12-06 RX ADMIN — MONTELUKAST 10 MG: 10 TABLET, FILM COATED ORAL at 21:12

## 2020-12-06 RX ADMIN — BUDESONIDE 500 MCG: 0.5 INHALANT RESPIRATORY (INHALATION) at 20:01

## 2020-12-06 NOTE — PROGRESS NOTES
Problem: Falls - Risk of  Goal: *Absence of Falls  Description: Document Abrahan Harding Fall Risk and appropriate interventions in the flowsheet. Outcome: Progressing Towards Goal  Note: Fall Risk Interventions:  Mobility Interventions: Bed/chair exit alarm, Patient to call before getting OOB    Mentation Interventions: Adequate sleep, hydration, pain control    Medication Interventions: Bed/chair exit alarm, Patient to call before getting OOB    Elimination Interventions: Bed/chair exit alarm, Patient to call for help with toileting needs    History of Falls Interventions: Door open when patient unattended         Problem: Patient Education: Go to Patient Education Activity  Goal: Patient/Family Education  Outcome: Progressing Towards Goal     Problem: Pressure Injury - Risk of  Goal: *Prevention of pressure injury  Description: Document Jose Scale and appropriate interventions in the flowsheet.   Outcome: Progressing Towards Goal  Note: Pressure Injury Interventions:  Sensory Interventions: Assess changes in LOC    Moisture Interventions: Absorbent underpads    Activity Interventions: Pressure redistribution bed/mattress(bed type)    Mobility Interventions: Pressure redistribution bed/mattress (bed type)    Nutrition Interventions: Document food/fluid/supplement intake    Friction and Shear Interventions: Apply protective barrier, creams and emollients                Problem: Patient Education: Go to Patient Education Activity  Goal: Patient/Family Education  Outcome: Progressing Towards Goal     Problem: Discharge Planning  Goal: *Discharge to safe environment  Outcome: Progressing Towards Goal     Problem: Hypertension  Goal: *Blood pressure within specified parameters  Outcome: Progressing Towards Goal  Goal: *Fluid volume balance  Outcome: Progressing Towards Goal  Goal: *Labs within defined limits  Outcome: Progressing Towards Goal     Problem: Chronic Obstructive Pulmonary Disease (COPD)  Goal: *Oxygen saturation during activity within specified parameters  Outcome: Progressing Towards Goal  Goal: *Able to remain out of bed as prescribed  Outcome: Progressing Towards Goal  Goal: *Absence of hypoxia  Outcome: Progressing Towards Goal  Goal: *Optimize nutritional status  Outcome: Progressing Towards Goal     Problem: Patient Education: Go to Patient Education Activity  Goal: Patient/Family Education  Outcome: Progressing Towards Goal     Problem: Patient Education: Go to Patient Education Activity  Goal: Patient/Family Education  Outcome: Progressing Towards Goal     Problem: Pain  Goal: *Control of Pain  Outcome: Progressing Towards Goal     Problem: Patient Education: Go to Patient Education Activity  Goal: Patient/Family Education  Outcome: Progressing Towards Goal     Problem: Patient Education: Go to Patient Education Activity  Goal: Patient/Family Education  Outcome: Progressing Towards Goal     Problem: Patient Education: Go to Patient Education Activity  Goal: Patient/Family Education  Outcome: Progressing Towards Goal     Problem: Pressure Injury - Risk of  Goal: *Prevention of pressure injury  Description: Document Jose Scale and appropriate interventions in the flowsheet.   Outcome: Progressing Towards Goal  Note: Pressure Injury Interventions:  Sensory Interventions: Assess changes in LOC    Moisture Interventions: Absorbent underpads    Activity Interventions: Pressure redistribution bed/mattress(bed type)    Mobility Interventions: Pressure redistribution bed/mattress (bed type)    Nutrition Interventions: Document food/fluid/supplement intake    Friction and Shear Interventions: Apply protective barrier, creams and emollients    Problem: Discharge Planning  Goal: *Discharge to safe environment  Outcome: Progressing Towards Goal     Problem: Hypertension  Goal: *Blood pressure within specified parameters  Outcome: Progressing Towards Goal     Problem: Pain  Goal: *Control of Pain  Outcome: Progressing Towards Goal

## 2020-12-06 NOTE — PROGRESS NOTES
Problem: Falls - Risk of  Goal: *Absence of Falls  Description: Document Thelma Hickey Fall Risk and appropriate interventions in the flowsheet. Outcome: Progressing Towards Goal  Note: Fall Risk Interventions:  Mobility Interventions: Patient to call before getting OOB, Bed/chair exit alarm    Mentation Interventions: Adequate sleep, hydration, pain control, More frequent rounding    Medication Interventions: Bed/chair exit alarm, Patient to call before getting OOB, Teach patient to arise slowly    Elimination Interventions: Bed/chair exit alarm, Call light in reach, Patient to call for help with toileting needs    History of Falls Interventions: Door open when patient unattended, Bed/chair exit alarm, Investigate reason for fall         Problem: Patient Education: Go to Patient Education Activity  Goal: Patient/Family Education  Outcome: Progressing Towards Goal     Problem: Pressure Injury - Risk of  Goal: *Prevention of pressure injury  Description: Document Jose Scale and appropriate interventions in the flowsheet.   Outcome: Progressing Towards Goal  Note: Pressure Injury Interventions:  Sensory Interventions: Assess changes in LOC, Discuss PT/OT consult with provider, Keep linens dry and wrinkle-free, Pressure redistribution bed/mattress (bed type)    Moisture Interventions: Absorbent underpads, Internal/External urinary devices, Maintain skin hydration (lotion/cream)    Activity Interventions: Pressure redistribution bed/mattress(bed type), PT/OT evaluation    Mobility Interventions: HOB 30 degrees or less, Pressure redistribution bed/mattress (bed type), PT/OT evaluation    Nutrition Interventions: Document food/fluid/supplement intake    Friction and Shear Interventions: Apply protective barrier, creams and emollients                Problem: Patient Education: Go to Patient Education Activity  Goal: Patient/Family Education  Outcome: Progressing Towards Goal     Problem: Discharge Planning  Goal: *Discharge to safe environment  Outcome: Progressing Towards Goal     Problem: Hypertension  Goal: *Blood pressure within specified parameters  Outcome: Progressing Towards Goal  Goal: *Fluid volume balance  Outcome: Progressing Towards Goal  Goal: *Labs within defined limits  Outcome: Progressing Towards Goal     Problem: Chronic Obstructive Pulmonary Disease (COPD)  Goal: *Oxygen saturation during activity within specified parameters  Outcome: Progressing Towards Goal  Goal: *Able to remain out of bed as prescribed  Outcome: Progressing Towards Goal  Goal: *Absence of hypoxia  Outcome: Progressing Towards Goal  Goal: *Optimize nutritional status  Outcome: Progressing Towards Goal     Problem: Patient Education: Go to Patient Education Activity  Goal: Patient/Family Education  Outcome: Progressing Towards Goal     Problem: Patient Education: Go to Patient Education Activity  Goal: Patient/Family Education  Outcome: Progressing Towards Goal     Problem: Pain  Goal: *Control of Pain  Outcome: Progressing Towards Goal     Problem: Patient Education: Go to Patient Education Activity  Goal: Patient/Family Education  Outcome: Progressing Towards Goal     Problem: Patient Education: Go to Patient Education Activity  Goal: Patient/Family Education  Outcome: Progressing Towards Goal

## 2020-12-06 NOTE — PROGRESS NOTES
Problem: Discharge Planning  Goal: *Discharge to safe environment  Outcome: Progressing Towards Goal      Spoke with pt Jovani ERICKSON about SNF and she stated patient has moved up a level at [facility] and will be like a SNF as they do ADLs , therapy and can get [facility] on top of that. POA states it is best to go back to Assisted Living. Contact at [facility] is Shadia Ventura [phone]. POA states there is someone above her. CM was unable to get Shadia Ventura on phone Friday. POA is calling today and leaving message as well    Genesis Leung Located within [facility] [phone] and office closed. [address] works with [facility].  Pt will need orders    Hodan Gu RN BSN  Outcomes Manager  Office # [phone]  Pager # [phone]

## 2020-12-06 NOTE — PROGRESS NOTES
Pt in bed resting alert and oriented x 2  denies pain at the moment. Assessement completed plan of care for the shift explained pt verbalized understanding. IVF infusing wel, HOB elevated, bed low and in locked position. Call light and frequently used items within reach. 2100 Incontinence care done  2230 Patient is in bed, resting schedlued med given. 0200 - Patient sleeping. No sign of distress. Bed low, call bell within reach. 0300 -Patient  sleeping. No sign of distress  0430- Pt incontinent of urine and stool - care done - pt made comfortable in bed. Linen changed. 0750 -Bedside, Verbal, and Written shift change report given to Daphne RN (oncoming nurse) by Jason Villasenor RN (offgoing nurse). Report included the following information SBAR, Kardex, and MAR.

## 2020-12-06 NOTE — PROGRESS NOTES
Discharge/Transition Planning    Verbal order , read back for Rapid Covid .  Pt returning to Assisted Living tomorrow

## 2020-12-06 NOTE — ROUTINE PROCESS
0730  -- Bedside, Verbal and Written shift change report received by Jazmin Grant (oncoming nurse) by Sr. Shelton(offgoing nurse). Allergy band placed on pt's wrist. Report included the following information SBAR, Kardex, Intake/Output, MAR and Recent Results. 0830-Assessment completed, call bell within reach, no distress noted. 1030-- AM  medications administered, pt tolerated with ease, will continue to monitor. 1200 -- Shift reassessment, pt condition unchanged, will continue to monitor. 1600 --  Shift reassessment, pt condition unchanged, will continue to monitor. 1940 -- Bedside, Verbal and Written shift change report given to Sr. Shelton(oncoming nurse) by Jazmin Grant (offgoing nurse). Report included the following information SBAR, Kardex, Intake/Output, MAR and Recent Results. Skin assessment completed.

## 2020-12-06 NOTE — PROGRESS NOTES
1130: patients chart reviewed. Attempted to see for PT treatment this am however, pt refused. She was oriented to self and place however, was very addiment to stay laying in current position even with max encouragement. PT offered to help patient into a better position in bed and pt denied stating that she was actually very comfortable. No PT treatment given this day.      Arnold Marino PT, DPT

## 2020-12-06 NOTE — PROGRESS NOTES
Internal Medicine Progress Note        NAME: Micheline Gibson   :  1945  MRM:  [id number]    Date/Time: 2020        ASSESSMENT/PLAN:                                   D/C SNF per PT  . Likely D/C in am either to AL or her place in am , CM working on it . Stable for D/C     #  Hypercalcemia (2020). Improved and normalized  with IVF  Hydration.          #  Hyponatremia (2020). Improved. IVF NS . On presentation to ER, clinically patient was looking dry .        #  Hypokalemia , Hypo po4, hypo mag. . Replete. Trend.      # Metabolic encephalopathy due to Metabolic derangement . Ho dementia on aricept also . Mentally improved , seem at her base line           # Acquired hypothyroidism (2020) on Synthroid. TSH 72 in ER on presentation . Unclear if she is taking her meds regularly. Also been vomiting lately per POA. Possible not taking the medicine correctly ie they give her all meds at same time   Synthroid dose increased slightly   Monitor TSH/FT4 tests     #   COPD (chronic obstructive pulmonary disease) ([facility] (4/10/2019). Prn nebs     # ho   Lung cancer . Squamous cell lung cancer on XRT. Records from oncology clinic.   no current chemo or radio- [address] [place] [address] oncology       # Chronic respiratory failure ([address] (2020) on home o2. Maintain o2     #  HTN (hypertension) (2020).  Control BP      # ho  Dementia ([facility] (2020)     # Continue home regimen / Medications when appropriate.      -DVT prophylaxis :  heparin.   - Code Status : DNR       Lab Review:     Recent Labs     20  0115   WBC 10.6   HGB 10.5*   HCT 33.7*        Recent Labs     20  0115 20  0235 20  0410   * 130* 133*   K 4.4 3.4* 3.6   CL 99* 96* 97*   CO2 24 26 29   GLU 72* 62* 63*   BUN 10 9 10   CREA 0.88 0.84 0.93   CA 8.9 9.3 10.5*   MG 2.1 1.4* 2.0   PHOS 2.1* 3.0 2.2*   ALB  --  2.6*  --    TBILI  --  0.3  --    ALT  --  9*  --    INR  --  1.0  --      Lab Results Component Value Date/Time    Glucose (POC) 174 (H) 04/14/2019 09:42 PM    Glucose, POC 92 02/28/2018 10:49 AM     No results for input(s): PH, PCO2, PO2, HCO3, FIO2 in the last 72 hours. Recent Labs     12/05/20  0235   INR 1.0       No results found for: SDES  No results found for: CULT    All Cardiac Markers in the last 24 hours:   No results found for: CPK, CK, CKMMB, CKMB, RCK3, CKMBT, CKNDX, CKND1, SHANTI, TROPT, TROIQ, BARRY, TROPT, TNIPOC, BNP, BNPP        Intervals noted   Pt s/e @ bedside     Subjective:     Chief Complaint:  Offer no complain . Eating BKFast   Report feeling and eating  well . Slept ok. Objective:     Vitals:  Last 24hrs VS reviewed since prior progress note. Most recent are:    Visit Vitals  /74 (BP 1 Location: Left arm, BP Patient Position: At rest)   Pulse 73   Temp 97.7 °F (36.5 °C)   Resp 18   Ht 4' 11\" (1.499 m)   Wt 52.3 kg (115 lb 6.4 oz)   SpO2 99%   BMI 23.31 kg/m²     SpO2 Readings from Last 6 Encounters:   12/06/20 99%   11/18/20 95%   11/13/20 100%   09/17/19 98%   04/16/19 95%   02/28/18 97%    O2 Flow Rate (L/min): 2 l/min       Intake/Output Summary (Last 24 hours) at 12/6/2020 0851  Last data filed at 12/6/2020 0400  Gross per 24 hour   Intake 1680 ml   Output 500 ml   Net 1180 ml          Physical Exam:   Gen:  Appear stated age, Well-developed,   in no acute distress  HEENT: upper and lower dentures,  Head atraumatic, normocephalic , hearing intact to voice, moist  mucous membranes. Neck:   Trachea midline , No apparent JVD, Supple,  thyroid non-tender  Resp:    No accessory muscle use,Bilateral BS present, clear breath sounds without wheezes rales or rhonchi  Card:   normal S1, S2 without Gallop . No Significant lower leg peripheral edema. Abd:  Soft, non-tender, non-distended, bowel sounds are present    Musc: hard bony mass L upper back at L scapula region about 5 cm,  No cyanosis or clubbing. Skin:   Warm and dry .  No rashes or ulcers   Neuro: Marixae Slice. No  facial asymmetry,  no clear area of focal motor weakness     alert.     Medications Reviewed: (see below)    Lab Data Reviewed: (see below)    ______________________________________________________________________    Medications:     Current Facility-Administered Medications   Medication Dose Route Frequency    levothyroxine (SYNTHROID) tablet 150 mcg  150 mcg Oral ACB    albuterol-ipratropium (DUO-NEB) 2.5 MG-0.5 MG/3 ML  3 mL Nebulization Q4H PRN    amLODIPine (NORVASC) tablet 10 mg  10 mg Oral DAILY    aspirin chewable tablet 81 mg  81 mg Oral DAILY    atorvastatin (LIPITOR) tablet 40 mg  40 mg Oral DAILY    [START ON 12/7/2020] ergocalciferol capsule 50,000 Units  50,000 Units Oral every Monday    citalopram (CELEXA) tablet 20 mg  20 mg Oral DAILY    donepeziL (ARICEPT) tablet 5 mg  5 mg Oral QHS    fluticasone propionate (FLONASE) 50 mcg/actuation nasal spray 2 Spray  2 Spray Both Nostrils DAILY    lamoTRIgine (LaMICtal) tablet 100 mg  100 mg Oral DAILY    folic acid (FOLVITE) tablet 1 mg  1 mg Oral DAILY    lisinopriL (PRINIVIL, ZESTRIL) tablet 20 mg  20 mg Oral DAILY    montelukast (SINGULAIR) tablet 10 mg  10 mg Oral QHS    pantoprazole (PROTONIX) tablet 40 mg  40 mg Oral ACB    sodium chloride (NS) flush 5-40 mL  5-40 mL IntraVENous Q8H    sodium chloride (NS) flush 5-40 mL  5-40 mL IntraVENous PRN    acetaminophen (TYLENOL) tablet 650 mg  650 mg Oral Q6H PRN    Or    acetaminophen (TYLENOL) suppository 650 mg  650 mg Rectal Q6H PRN    polyethylene glycol (MIRALAX) packet 17 g  17 g Oral DAILY PRN    promethazine (PHENERGAN) tablet 12.5 mg  12.5 mg Oral Q6H PRN    Or    ondansetron (ZOFRAN) injection 4 mg  4 mg IntraVENous Q6H PRN    heparin (porcine) injection 5,000 Units  5,000 Units SubCUTAneous Q8H    arformoteroL (BROVANA) neb solution 15 mcg  15 mcg Nebulization BID RT    budesonide (PULMICORT) 500 mcg/2 ml nebulizer suspension  500 mcg Nebulization BID RT        Total time spent with patient: 25 minutes                  Care Plan discussed with: Care Manager, Nursing Staff and >50% of time spent in counseling and coordination of care      Discussed:  Care Plan              This document in whole or part of it has been produced using voice recognition software. Unrecognized errors in transcription may be present.     Attending Physician: Holly David MD

## 2020-12-06 NOTE — PROGRESS NOTES
Bedside shift change report given to BHUPENDRA Serna (oncoming nurse) by Minerva Car RN (offgoing nurse). Report included the following information SBAR, Kardex, Intake/Output, MAR and Recent Results. 0964 -- AM medications given, well tolerated. 1201 -- Patient refused her heparin. 1346 -- IV flushed. 1830 -- Covid test completed and taken down to lab. 1939 -- Rapid covid test negative    Bedside shift change report given to Kathi Tobias RN (oncoming nurse) by Felicity Cruz RN (offgoing nurse). Report included the following information SBAR, Kardex, Intake/Output, MAR and Recent Results.

## 2020-12-07 LAB
ALBUMIN SERPL-MCNC: 2.7 G/DL (ref 3.4–5)
ALBUMIN/GLOB SERPL: 0.7 {RATIO} (ref 0.8–1.7)
ALP SERPL-CCNC: 129 U/L (ref 45–117)
ALT SERPL-CCNC: 10 U/L (ref 13–56)
ANION GAP SERPL CALC-SCNC: 10 MMOL/L (ref 3–18)
AST SERPL-CCNC: 18 U/L (ref 10–38)
BILIRUB DIRECT SERPL-MCNC: <0.1 MG/DL (ref 0–0.2)
BILIRUB SERPL-MCNC: 0.4 MG/DL (ref 0.2–1)
BUN SERPL-MCNC: 9 MG/DL (ref 7–18)
BUN/CREAT SERPL: 10 (ref 12–20)
CALCIUM SERPL-MCNC: 9.3 MG/DL (ref 8.5–10.1)
CHLORIDE SERPL-SCNC: 98 MMOL/L (ref 100–111)
CO2 SERPL-SCNC: 23 MMOL/L (ref 21–32)
CREAT SERPL-MCNC: 0.91 MG/DL (ref 0.6–1.3)
ERYTHROCYTE [DISTWIDTH] IN BLOOD BY AUTOMATED COUNT: 18 % (ref 11.6–14.5)
GLOBULIN SER CALC-MCNC: 4.1 G/DL (ref 2–4)
GLUCOSE SERPL-MCNC: 85 MG/DL (ref 74–99)
HCT VFR BLD AUTO: 36 % (ref 35–45)
HGB BLD-MCNC: 11.3 G/DL (ref 12–16)
INR PPP: 1 (ref 0.8–1.2)
MAGNESIUM SERPL-MCNC: 1.8 MG/DL (ref 1.6–2.6)
MCH RBC QN AUTO: 26 PG (ref 24–34)
MCHC RBC AUTO-ENTMCNC: 31.4 G/DL (ref 31–37)
MCV RBC AUTO: 82.9 FL (ref 74–97)
PHOSPHATE SERPL-MCNC: 2.9 MG/DL (ref 2.5–4.9)
PLATELET # BLD AUTO: 315 K/UL (ref 135–420)
PMV BLD AUTO: 9.6 FL (ref 9.2–11.8)
POTASSIUM SERPL-SCNC: 4.2 MMOL/L (ref 3.5–5.5)
PROT SERPL-MCNC: 6.8 G/DL (ref 6.4–8.2)
PROTHROMBIN TIME: 12.7 SEC (ref 11.5–15.2)
RBC # BLD AUTO: 4.34 M/UL (ref 4.2–5.3)
SODIUM SERPL-SCNC: 131 MMOL/L (ref 136–145)
T4 FREE SERPL-MCNC: 1 NG/DL (ref 0.7–1.5)
TSH SERPL DL<=0.05 MIU/L-ACNC: 42.9 UIU/ML (ref 0.36–3.74)
WBC # BLD AUTO: 10.9 K/UL (ref 4.6–13.2)

## 2020-12-07 PROCEDURE — 94761 N-INVAS EAR/PLS OXIMETRY MLT: CPT

## 2020-12-07 PROCEDURE — 94640 AIRWAY INHALATION TREATMENT: CPT

## 2020-12-07 PROCEDURE — 84443 ASSAY THYROID STIM HORMONE: CPT

## 2020-12-07 PROCEDURE — 36415 COLL VENOUS BLD VENIPUNCTURE: CPT

## 2020-12-07 PROCEDURE — 84439 ASSAY OF FREE THYROXINE: CPT

## 2020-12-07 PROCEDURE — 84100 ASSAY OF PHOSPHORUS: CPT

## 2020-12-07 PROCEDURE — 65270000029 HC RM PRIVATE

## 2020-12-07 PROCEDURE — 80048 BASIC METABOLIC PNL TOTAL CA: CPT

## 2020-12-07 PROCEDURE — 85610 PROTHROMBIN TIME: CPT

## 2020-12-07 PROCEDURE — 74011250636 HC RX REV CODE- 250/636: Performed by: INTERNAL MEDICINE

## 2020-12-07 PROCEDURE — 2709999900 HC NON-CHARGEABLE SUPPLY

## 2020-12-07 PROCEDURE — 74011250637 HC RX REV CODE- 250/637: Performed by: INTERNAL MEDICINE

## 2020-12-07 PROCEDURE — 83735 ASSAY OF MAGNESIUM: CPT

## 2020-12-07 PROCEDURE — 80076 HEPATIC FUNCTION PANEL: CPT

## 2020-12-07 PROCEDURE — 85027 COMPLETE CBC AUTOMATED: CPT

## 2020-12-07 PROCEDURE — 74011000250 HC RX REV CODE- 250: Performed by: INTERNAL MEDICINE

## 2020-12-07 RX ADMIN — LEVOTHYROXINE SODIUM 150 MCG: 0.07 TABLET ORAL at 06:07

## 2020-12-07 RX ADMIN — ACETAMINOPHEN 650 MG: 325 TABLET, FILM COATED ORAL at 09:37

## 2020-12-07 RX ADMIN — PANTOPRAZOLE SODIUM 40 MG: 40 TABLET, DELAYED RELEASE ORAL at 09:33

## 2020-12-07 RX ADMIN — ARFORMOTEROL TARTRATE 15 MCG: 15 SOLUTION RESPIRATORY (INHALATION) at 08:12

## 2020-12-07 RX ADMIN — HEPARIN SODIUM 5000 UNITS: 5000 INJECTION INTRAVENOUS; SUBCUTANEOUS at 05:52

## 2020-12-07 RX ADMIN — Medication 10 ML: at 05:52

## 2020-12-07 RX ADMIN — BUDESONIDE 500 MCG: 0.5 INHALANT RESPIRATORY (INHALATION) at 20:26

## 2020-12-07 RX ADMIN — ARFORMOTEROL TARTRATE 15 MCG: 15 SOLUTION RESPIRATORY (INHALATION) at 20:26

## 2020-12-07 RX ADMIN — ATORVASTATIN CALCIUM 40 MG: 20 TABLET, FILM COATED ORAL at 09:33

## 2020-12-07 RX ADMIN — ASPIRIN 81 MG: 81 TABLET, CHEWABLE ORAL at 09:33

## 2020-12-07 RX ADMIN — Medication 10 ML: at 23:19

## 2020-12-07 RX ADMIN — LAMOTRIGINE 100 MG: 100 TABLET ORAL at 09:33

## 2020-12-07 RX ADMIN — FOLIC ACID 1 MG: 1 TABLET ORAL at 09:33

## 2020-12-07 RX ADMIN — ACETAMINOPHEN 650 MG: 325 TABLET, FILM COATED ORAL at 23:16

## 2020-12-07 RX ADMIN — CITALOPRAM HYDROBROMIDE 20 MG: 20 TABLET ORAL at 09:33

## 2020-12-07 RX ADMIN — BUDESONIDE 500 MCG: 0.5 INHALANT RESPIRATORY (INHALATION) at 08:12

## 2020-12-07 RX ADMIN — HEPARIN SODIUM 5000 UNITS: 5000 INJECTION INTRAVENOUS; SUBCUTANEOUS at 12:55

## 2020-12-07 RX ADMIN — HEPARIN SODIUM 5000 UNITS: 5000 INJECTION INTRAVENOUS; SUBCUTANEOUS at 20:12

## 2020-12-07 RX ADMIN — LISINOPRIL 20 MG: 20 TABLET ORAL at 09:33

## 2020-12-07 RX ADMIN — DONEPEZIL HYDROCHLORIDE 5 MG: 5 TABLET, FILM COATED ORAL at 21:16

## 2020-12-07 RX ADMIN — AMLODIPINE BESYLATE 10 MG: 10 TABLET ORAL at 09:33

## 2020-12-07 RX ADMIN — MONTELUKAST 10 MG: 10 TABLET, FILM COATED ORAL at 21:16

## 2020-12-07 RX ADMIN — FLUTICASONE PROPIONATE 2 SPRAY: 50 SPRAY, METERED NASAL at 09:34

## 2020-12-07 RX ADMIN — Medication 10 ML: at 14:00

## 2020-12-07 NOTE — PROGRESS NOTES
Respiratory Therapy Assessment Care Plan    Patient:  Macie Chua 66 y.o. female 12/7/2020 8:19 AM    Hypercalcemia [E83.52]      Chest X-RAY:   Results from Hospital Encounter encounter on 12/01/20   XR SHOULDER RT AP/LAT MIN 2 V    Impression IMPRESSION:    Small subacromial enthesophyte which could contribute to the clinical  impingement syndrome. Greater humeral tuberosity subchondral cysts, a secondary  sign of possible rotator cuff pathology. XR CHEST PORT    Impression Impression:    No acute cardiopulmonary disease. Nonspecific medial right lung base opacity,  elevated diaphragm or possible hernia and less likely mass. Results from [place] encounter on 11/18/20   XR SHOULDER RT AP/LAT MIN 2 V    Impression IMPRESSION:  No acute abnormality of the right shoulder.             Vital Signs:     Visit Vitals  BP (!) 144/82   Pulse 78   Temp 97.4 °F (36.3 °C)   Resp 20   Ht 4' 11\" (1.499 m)   Wt 52.3 kg (115 lb 6.4 oz)   SpO2 97%   BMI 23.31 kg/m²         Indications for treatment:  COPD / O2 dependent / Emphysema     Plan of care: O2 with Brovana / Pulmicort         Goal: COPD maintenance

## 2020-12-07 NOTE — PROGRESS NOTES
Chart reviewed. Called [facility] [employer] per Melina Greer, pt is ok to return. Sent perfect serve message to Dr. Diana Elena. Gita Benito RN,ext [phone].

## 2020-12-07 NOTE — PROGRESS NOTES
0720 Bedside, Verbal and Written shift change report given to 27 Lexy Sultana (oncoming nurse) by Estelle Weston RN (offgoing nurse). Report included the following information SBAR, Kardex, Intake/Output, MAR and Recent Results. 0915 AM medications administered, pt tolerated with ease, will continue to monitor. 1200 Shift reassessment, pt condition unchanged, will continue to monitor. 1600  Shift reassessment, pt sleeping between care, will continue to monitor. 1900 Bedside, Verbal and Written shift change report given to 1973 Sesar Summers (oncoming nurse) by Nicolette Rdz RN (offgoing nurse). Report included the following information SBAR, Kardex, Intake/Output, MAR and Recent Results. Skin assessment completed.

## 2020-12-07 NOTE — PROGRESS NOTES
Progress Note      Patient: [place]               Sex: female          DOA: 12/1/2020       YOB: 1945      Age:  66 y.o.        LOS:  LOS: 6 days             CHIEF COMPLAINT:  Metabolic encephalopathy is improving    Subjective:     Patient is smiling and interactive   No distress    Objective:      Visit Vitals  /74   Pulse 81   Temp 97.9 °F (36.6 °C)   Resp 18   Ht 4' 11\" (1.499 m)   Wt 52.3 kg (115 lb 6.4 oz)   SpO2 98%   BMI 23.31 kg/m²       Physical Exam:  Gen:  No distress, no complaint  Lungs:  Clear bilaterally, no wheeze or rhonchi  Heart:  Regular rate and rhythm, no murmurs or gallops  Abdomen:  Soft, non-tender, normal bowel sounds        Lab/Data Reviewed:  BMP:   Lab Results   Component Value Date/Time     (L) 12/07/2020 04:22 AM    K 4.2 12/07/2020 04:22 AM    CL 98 (L) 12/07/2020 04:22 AM    CO2 23 12/07/2020 04:22 AM    AGAP 10 12/07/2020 04:22 AM    GLU 85 12/07/2020 04:22 AM    BUN 9 12/07/2020 04:22 AM    CREA 0.91 12/07/2020 04:22 AM    GFRAA >60 12/07/2020 04:22 AM    GFRNA 60 (L) 12/07/2020 04:22 AM     CBC:   Lab Results   Component Value Date/Time    WBC 10.9 12/07/2020 04:22 AM    HGB 11.3 (L) 12/07/2020 04:22 AM    HCT 36.0 12/07/2020 04:22 AM     12/07/2020 04:22 AM           Assessment/Plan     Principal Problem:    Hypercalcemia (12/1/2020)    Active Problems:    COPD (chronic obstructive pulmonary disease) ([facility] [address] (4/10/2019)      Hypokalemia (2/86/7726)      Metabolic encephalopathy (37/8/4522)      Hyponatremia (12/1/2020)      [facility] ([facility] (12/1/2020)      Chronic respiratory failure ([facility] (12/1/2020)      HTN (hypertension) (12/1/2020)      Acquired hypothyroidism (12/1/2020)      Dementia ([facility] (12/1/2020)        Plan: Mobilize   BP control  Patient is at mental status baseline  Anticipate being able to transfer to SNF tomorrow.

## 2020-12-07 NOTE — PROGRESS NOTES
1325: Attempted PT session. Sleeping with HOB elevated and untouched lunch tray in front. Awakens to voice. Declines PT, stating \"I'm eating\" and taking a bite of salad. Jenny Chicas, [facility] would like patient to participate. Will follow up.   1359: 2nd PT attempt. Awake. Continues to be eating lunch. Encouraged therapy session post lunch. Patient declines stating \"not particularly but thanks for asking\". Will follow up.

## 2020-12-07 NOTE — PROGRESS NOTES
Problem: Chronic Obstructive Pulmonary Disease (COPD)  Goal: *Oxygen saturation during activity within specified parameters  Outcome: Progressing Towards Goal   Respiratory Therapy Assessment Care Plan    Patient:  Micheline Gibson 66 y.o. female 12/6/2020 7:56 PM    Hypercalcemia [E83.52]      Chest X-RAY:   Results from Hospital Encounter encounter on 12/01/20   XR SHOULDER RT AP/LAT MIN 2 V    Impression IMPRESSION:    Small subacromial enthesophyte which could contribute to the clinical  impingement syndrome. Greater humeral tuberosity subchondral cysts, a secondary  sign of possible rotator cuff pathology. XR CHEST PORT    Impression Impression:    No acute cardiopulmonary disease. Nonspecific medial right lung base opacity,  elevated diaphragm or possible hernia and less likely mass. Results from [place] encounter on 11/18/20   XR SHOULDER RT AP/LAT MIN 2 V    Impression IMPRESSION:  No acute abnormality of the right shoulder.             Vital Signs:   Visit Vitals  /76 (BP 1 Location: Left arm, BP Patient Position: At rest)   Pulse 80   Temp 98.5 °F (36.9 °C)   Resp 18   Ht 4' 11\" (1.499 m)   Wt 52.3 kg (115 lb 6.4 oz)   SpO2 98%   BMI 23.31 kg/m²         Indications for treatment: Hx: COPD      Plan of care:[place]/ Pulmicort BID/ Oxygen Therapy        Goal:Improve work of breathing/ COPD maintenance

## 2020-12-07 NOTE — PROGRESS NOTES
1405: pt presents sitting up in bed, still slowly eating her lunch. Patient refused  participation in OT skilled intervention this date, despite max encouragement and mention of POMARISA/Mariel's name to increase participation. Patient appeared to become agitated and argumentative that she didn't need therapy and stated that she was not stupid, attempt to re-direct pt that no mention of this was made-nursing notified.      Nick Giron Ck [address] OTR/L  [phone]

## 2020-12-08 VITALS
HEART RATE: 74 BPM | OXYGEN SATURATION: 98 % | TEMPERATURE: 98 F | WEIGHT: 116 LBS | SYSTOLIC BLOOD PRESSURE: 131 MMHG | DIASTOLIC BLOOD PRESSURE: 77 MMHG | HEIGHT: 59 IN | BODY MASS INDEX: 23.39 KG/M2 | RESPIRATION RATE: 18 BRPM

## 2020-12-08 LAB
ANION GAP SERPL CALC-SCNC: 9 MMOL/L (ref 3–18)
BUN SERPL-MCNC: 16 MG/DL (ref 7–18)
BUN/CREAT SERPL: 17 (ref 12–20)
CALCIUM SERPL-MCNC: 9.9 MG/DL (ref 8.5–10.1)
CHLORIDE SERPL-SCNC: 98 MMOL/L (ref 100–111)
CO2 SERPL-SCNC: 23 MMOL/L (ref 21–32)
CREAT SERPL-MCNC: 0.92 MG/DL (ref 0.6–1.3)
GLUCOSE SERPL-MCNC: 71 MG/DL (ref 74–99)
POTASSIUM SERPL-SCNC: 4.1 MMOL/L (ref 3.5–5.5)
SODIUM SERPL-SCNC: 130 MMOL/L (ref 136–145)

## 2020-12-08 PROCEDURE — 80048 BASIC METABOLIC PNL TOTAL CA: CPT

## 2020-12-08 PROCEDURE — 74011250636 HC RX REV CODE- 250/636: Performed by: INTERNAL MEDICINE

## 2020-12-08 PROCEDURE — 94761 N-INVAS EAR/PLS OXIMETRY MLT: CPT

## 2020-12-08 PROCEDURE — 77010033678 HC OXYGEN DAILY

## 2020-12-08 PROCEDURE — 36415 COLL VENOUS BLD VENIPUNCTURE: CPT

## 2020-12-08 PROCEDURE — 74011000250 HC RX REV CODE- 250: Performed by: INTERNAL MEDICINE

## 2020-12-08 PROCEDURE — 2709999900 HC NON-CHARGEABLE SUPPLY

## 2020-12-08 PROCEDURE — 74011250637 HC RX REV CODE- 250/637: Performed by: INTERNAL MEDICINE

## 2020-12-08 PROCEDURE — 94640 AIRWAY INHALATION TREATMENT: CPT

## 2020-12-08 RX ORDER — CLONAZEPAM 0.12 MG/1
TABLET, ORALLY DISINTEGRATING ORAL
Qty: 10 TAB | Refills: 0 | Status: SHIPPED | OUTPATIENT
Start: 2020-12-08

## 2020-12-08 RX ADMIN — LAMOTRIGINE 100 MG: 100 TABLET ORAL at 08:41

## 2020-12-08 RX ADMIN — Medication 10 ML: at 05:21

## 2020-12-08 RX ADMIN — ARFORMOTEROL TARTRATE 15 MCG: 15 SOLUTION RESPIRATORY (INHALATION) at 09:06

## 2020-12-08 RX ADMIN — FLUTICASONE PROPIONATE 2 SPRAY: 50 SPRAY, METERED NASAL at 08:44

## 2020-12-08 RX ADMIN — ASPIRIN 81 MG: 81 TABLET, CHEWABLE ORAL at 08:36

## 2020-12-08 RX ADMIN — LISINOPRIL 20 MG: 20 TABLET ORAL at 08:37

## 2020-12-08 RX ADMIN — CITALOPRAM HYDROBROMIDE 20 MG: 20 TABLET ORAL at 08:38

## 2020-12-08 RX ADMIN — ACETAMINOPHEN 650 MG: 325 TABLET, FILM COATED ORAL at 08:11

## 2020-12-08 RX ADMIN — FOLIC ACID 1 MG: 1 TABLET ORAL at 08:40

## 2020-12-08 RX ADMIN — ATORVASTATIN CALCIUM 40 MG: 20 TABLET, FILM COATED ORAL at 08:38

## 2020-12-08 RX ADMIN — PANTOPRAZOLE SODIUM 40 MG: 40 TABLET, DELAYED RELEASE ORAL at 08:36

## 2020-12-08 RX ADMIN — BUDESONIDE 500 MCG: 0.5 INHALANT RESPIRATORY (INHALATION) at 09:06

## 2020-12-08 RX ADMIN — LEVOTHYROXINE SODIUM 150 MCG: 0.07 TABLET ORAL at 05:21

## 2020-12-08 RX ADMIN — AMLODIPINE BESYLATE 10 MG: 10 TABLET ORAL at 08:36

## 2020-12-08 RX ADMIN — HEPARIN SODIUM 5000 UNITS: 5000 INJECTION INTRAVENOUS; SUBCUTANEOUS at 05:21

## 2020-12-08 NOTE — PROGRESS NOTES
Bedside shift report received from [address]    2006: AOX2, on 2L 02 via NC, resting in bed, in no apparent distress; denies any pain; shift assessment completed    2116: scheduled meds given    2230: resting in bed; incontinence care done; pad changed; pericare done    2316: prn Tylenol given po for c/o pain all over back, shoulders    0100: sleeping; bed alarms on    0245: sleeping; bed alarms on    0520: awake; no needs voiced    0610: c/o being hungry; meal given    0720: Bedside and Verbal shift change report given to [address] (oncoming nurse) by Paco Box RN (offgoing nurse). Report included the following information SBAR, Kardex, Intake/Output and Recent Results.

## 2020-12-08 NOTE — PROGRESS NOTES
Discharge instructions prepared and placed in pt belongings bag for family to review. Belongings packed. Peripheral iv removed by nursing student. Plan for medical transport to [place]. EAT was 1130. Primary care nurse, Magen Benson to call report.

## 2020-12-08 NOTE — PROGRESS NOTES
0700 Bedside, Verbal and Written shift change report given to Ciera Sultana (oncoming nurse) by Chen Navarro RN (offgoing nurse). Report included the following information SBAR, Kardex, Intake/Output, MAR and Recent Results. Patient alert and oriented to self and place. Denies pain at this time. No distress noted. 9051 New orders received for patient to be discharged. 0830 AM medications administered without difficulty. Tylenol given for shoulder pain and a heat pack applied. 1030 PIV removed, patient cleaned and dressed in clothes for her discharge. 1145 Report verbally given to Melina Greer at [facility]. 1250 Report and paperwork given to Medical Transport, patient discharged.

## 2020-12-08 NOTE — PROGRESS NOTES
attempted to complete a follow up visit with and Spiritual assessment of patient in room 2218 but found here resting peacefully at this time and unable to communicate now. .offered Pastoral care, see flow sheets for interventions.  Chaplains will continue to follow and will provide pastoral care on an as needed/requested basis    Chaplain Ge Shanks   Board Certified [address] [place] [address]   [phone]

## 2020-12-08 NOTE — DISCHARGE SUMMARY
Discharge Summary    Patient: Charlie Ardon               Sex: female          DOA: 12/1/2020         YOB: 1945      Age:  66 y.o.        LOS:  LOS: 7 days                Admit Date: 12/1/2020    Discharge Date: 12/8/2020    Admission Diagnoses: Hypercalcemia [E83.52]    Discharge Diagnoses:    Problem List as of 12/8/2020 Date Reviewed: 1/31/2019          Codes Class Noted - Resolved    * (Principal) Hypercalcemia ICD-10-CM: Y42.31  ICD-9-CM: 275.42  12/1/2020 - Present        Metabolic encephalopathy VKY-22-OZ: G93.41  ICD-9-CM: 348.31  12/1/2020 - Present        Hyponatremia ICD-10-CM: E87.1  ICD-9-CM: 276.1  12/1/2020 - Present        Lung cancer ([facility] ICD-10-CM: C34.90  ICD-9-CM: 162.9  12/1/2020 - Present        Chronic respiratory failure ([facility] ICD-10-CM: J96.10  ICD-9-CM: 518.83  12/1/2020 - Present        HTN (hypertension) ICD-10-CM: I10  ICD-9-CM: 401.9  12/1/2020 - Present        Acquired hypothyroidism ICD-10-CM: E03.9  ICD-9-CM: 244.9  12/1/2020 - Present        Dementia ([facility] ICD-10-CM: F03.90  ICD-9-CM: 294.20  12/1/2020 - Present        COPD (chronic obstructive pulmonary disease) ([facility] ICD-10-CM: J44.9  ICD-9-CM: 727  4/10/2019 - Present        Hypokalemia ICD-10-CM: E87.6  ICD-9-CM: 276.8  4/10/2019 - Present        Pneumonia ICD-10-CM: J18.9  ICD-9-CM: 905  4/10/2019 - Present        Encounter for colonoscopy due to history of adenomatous colonic polyps ICD-10-CM: Z12.11, Z86.010  ICD-9-CM: V76.51, V12.72  2/28/2018 - Present        Stone's esophagus determined by biopsy ICD-10-CM: K22.70  ICD-9-CM: 530.85  2/28/2018 - Present              Discharge Condition:  Improved    Hospital Course:  Ms Neville Garcia is a 66year old female who presented to the ER with confusion. She has a previous history of squamous cell lung cancer. In the ER she was found to have hypercalcemia and hyperkalemia. She was admitted for ongoing management.     Ms Neville Garcia was begun on IVF and her electrolyte disturbances were corrected. Her metabolic encephalopathy also resolved. She is currently at her mental status baseline with known dementia. Her Code Status has been DNR in the Hospital.  She has continued to improve and she is ready for discharge to SNF. She can transfer later today. Discharge Medications:     Current Discharge Medication List      CONTINUE these medications which have CHANGED    Details   clonazePAM (KlonoPIN) 0.125 mg disintegrating tablet place 1 tablet by mouth once daily as needed for anxiety  Qty: 10 Tab, Refills: 0    Associated Diagnoses: Anxiety         CONTINUE these medications which have NOT CHANGED    Details   potassium chloride SR (K-TAB) 20 mEq tablet Take 2 Tabs by mouth daily. Qty: 10 Tab, Refills: 0      acetaminophen (TYLENOL) 325 mg tablet Take 2 Tabs by mouth every four (4) hours as needed for Pain. Qty: 20 Tab, Refills: 0      lidocaine (Lidoderm) 5 % Apply patch to the affected area for 12 hours a day and remove for 12 hours a day. Qty: 1 Each, Refills: 0      albuterol (PROVENTIL VENTOLIN) 2.5 mg /3 mL (0.083 %) nebulizer solution 3 mL by Nebulization route every six (6) hours as needed for Wheezing. Qty: 24 Each, Refills: 0      levomefolate calcium (DEPLIN PO) Take 15 mg by mouth daily. escitalopram oxalate (LEXAPRO) 10 mg tablet Take 10 mg by mouth daily. citalopram (CELEXA) 20 mg tablet Take 20 mg by mouth daily. donepezil (ARICEPT) 10 mg tablet 1 tablet daily  Refills: 0      ergocalciferol (ERGOCALCIFEROL) 50,000 unit capsule Take 1 Cap by Mouth Every Monday. fluticasone (FLONASE) 50 mcg/actuation nasal spray 2 Sprays daily as needed. montelukast (SINGULAIR) 10 mg tablet Take 1 Tab by Mouth Daily as needed. SYMBICORT 160-4.5 mcg/actuation HFAA       lamoTRIgine (LAMICTAL) 100 mg tablet 1 tab at bedtime  Refills: 0      levothyroxine (SYNTHROID) 88 mcg tablet Take 100 mcg by mouth Daily (before breakfast). Indications: hypothyroidism      lisinopril (PRINIVIL, ZESTRIL) 40 mg tablet Take 20 mg by mouth daily. aspirin 81 mg chewable tablet Take 81 mg by mouth daily. amLODIPine (NORVASC) 2.5 mg tablet Take 10 mg by mouth daily. Cholecalciferol, Vitamin D3, 50,000 unit cap Take 1 Cap by mouth every seven (7) days. Omeprazole delayed release (PRILOSEC D/R) 20 mg tablet Take 40 mg by mouth daily. atorvastatin (LIPITOR) 40 mg tablet Take 40 mg by mouth daily.              Activity: Activity as tolerated    Diet: Regular Diet    Wound Care: None needed    Follow-up: Follow up with staff MD on arrival.

## 2020-12-08 NOTE — PROGRESS NOTES
Problem: Chronic Obstructive Pulmonary Disease (COPD)  Goal: *Oxygen saturation during activity within specified parameters  Outcome: Progressing Towards Goal  Goal: *Absence of hypoxia  Outcome: Progressing Towards Goal   Respiratory Therapy Assessment Care Plan    Patient:  Zoya Kurtz 66 y.o. female 12/8/2020 9:09 AM    Hypercalcemia [E83.52]      Chest X-RAY:   Results from Hospital Encounter encounter on 12/01/20   XR SHOULDER RT AP/LAT MIN 2 V    Impression IMPRESSION:    Small subacromial enthesophyte which could contribute to the clinical  impingement syndrome. Greater humeral tuberosity subchondral cysts, a secondary  sign of possible rotator cuff pathology. XR CHEST PORT    Impression Impression:    No acute cardiopulmonary disease. Nonspecific medial right lung base opacity,  elevated diaphragm or possible hernia and less likely mass. Results from [place] encounter on 11/18/20   XR SHOULDER RT AP/LAT MIN 2 V    Impression IMPRESSION:  No acute abnormality of the right shoulder.             Vital Signs:   Visit Vitals  /70   Pulse 79   Temp 97.8 °F (36.6 °C)   Resp 18   Ht 4' 11\" (1.499 m)   Wt 52.6 kg (116 lb)   SpO2 99%   BMI 23.43 kg/m²         Indications for treatment: COPD, chronic resp failure      Plan of care: Ilan Peter BID, supplemental 02        Goal: Maintain adequate aeration/oxygenation to decrease WOB and enchance ADL's for d/c to home setting

## 2020-12-08 NOTE — PROGRESS NOTES
Problem: Falls - Risk of  Goal: *Absence of Falls  Description: Document Lluvia Retana Fall Risk and appropriate interventions in the flowsheet.   Outcome: Progressing Towards Goal  Note: Fall Risk Interventions:  Mobility Interventions: Bed/chair exit alarm, Patient to call before getting OOB, Strengthening exercises (ROM-active/passive)    Mentation Interventions: Bed/chair exit alarm, Adequate sleep, hydration, pain control, Door open when patient unattended, More frequent rounding, Toileting rounds, Reorient patient    Medication Interventions: Bed/chair exit alarm, Evaluate medications/consider consulting pharmacy    Elimination Interventions: Call light in reach, Toileting schedule/hourly rounds, Bed/chair exit alarm    History of Falls Interventions: Bed/chair exit alarm, Door open when patient unattended

## 2020-12-08 NOTE — PROGRESS NOTES
Problem: Discharge Planning  Goal: *Discharge to safe environment  Outcome: Resolved/Met   [id number] [facility],[address]  Transport Time: 1130am  Call [phone]    Completed PCS and faxed to [facility] [facility]. Called [facility] and first available is 1130am.  [facility](Covid[facility] , vitals, and home therapy to [facility]. Discharge packet on unit. Tried calling [facility] and keeps ringing. Called [facility] and notified  Notified [facility] Management Interventions  PCP Verified by CM:  Yes  Mode of Transport at Discharge: BLS  Transition of Care Consult (CM Consult): Assisted Living  Current Support Network: Assisted Living  Confirm Follow Up Transport: Other (see comment)  Discharge Location  Discharge Placement: Assisted Living

## 2020-12-08 NOTE — DISCHARGE INSTRUCTIONS
DISCHARGE SUMMARY from Nurse    PATIENT INSTRUCTIONS:    After general anesthesia or intravenous sedation, for 24 hours or while taking prescription Narcotics:  · Limit your activities  · Do not drive and operate hazardous machinery  · Do not make important personal or business decisions  · Do  not drink alcoholic beverages  · If you have not urinated within 8 hours after discharge, please contact your surgeon on call. Report the following to your surgeon:  · Excessive pain, swelling, redness or odor of or around the surgical area  · Temperature over 100.5  · Nausea and vomiting lasting longer than 4 hours or if unable to take medications  · Any signs of decreased circulation or nerve impairment to extremity: change in color, persistent  numbness, tingling, coldness or increase pain  · Any questions    What to do at Home:  Recommended activity: Activity as tolerated and PT/OT Eval and Treat    If you experience any of the following symptoms change in alertness/orientation, fever or chills, please follow up with rehab staff. *  Please give a list of your current medications to your Primary Care Provider. *  Please update this list whenever your medications are discontinued, doses are      changed, or new medications (including over-the-counter products) are added. *  Please carry medication information at all times in case of emergency situations. These are general instructions for a healthy lifestyle:    No smoking/ No tobacco products/ Avoid exposure to second hand smoke  Surgeon General's Warning:  Quitting smoking now greatly reduces serious risk to your health.     Obesity, smoking, and sedentary lifestyle greatly increases your risk for illness    A healthy diet, regular physical exercise & weight monitoring are important for maintaining a healthy lifestyle    You may be retaining fluid if you have a history of heart failure or if you experience any of the following symptoms:  Weight gain of 3 pounds or more overnight or 5 pounds in a week, increased swelling in our hands or feet or shortness of breath while lying flat in bed. Please call your doctor as soon as you notice any of these symptoms; do not wait until your next office visit. The discharge information has been reviewed with the patient and caregiver. The patient and caregiver verbalized understanding. Discharge medications reviewed with the patient and caregiver and appropriate educational materials and side effects teaching were provided. Patient discharged without removing armband and transfered to another healthcare acute, sub acute , or extended care facility. Informed of privacy risks if armband lost or stolen.

## 2020-12-15 PROBLEM — C34.90 METASTATIC LUNG CANCER (METASTASIS FROM LUNG TO OTHER SITE) (HCC): Status: ACTIVE | Noted: 2020-12-15

## 2022-09-18 NOTE — PROGRESS NOTES
Problem: Falls - Risk of  Goal: *Absence of Falls  Description: Document Tito Hicks Fall Risk and appropriate interventions in the flowsheet. Outcome: Progressing Towards Goal  Note: Fall Risk Interventions:  Mobility Interventions: Bed/chair exit alarm, Patient to call before getting OOB    Mentation Interventions: Adequate sleep, hydration, pain control    Medication Interventions: Bed/chair exit alarm, Patient to call before getting OOB    Elimination Interventions: Bed/chair exit alarm, Patient to call for help with toileting needs    History of Falls Interventions: Door open when patient unattended         Problem: Pressure Injury - Risk of  Goal: *Prevention of pressure injury  Description: Document Jose Scale and appropriate interventions in the flowsheet.   Outcome: Progressing Towards Goal  Note: Pressure Injury Interventions:  Sensory Interventions: Assess changes in LOC    Moisture Interventions: Absorbent underpads    Activity Interventions: Pressure redistribution bed/mattress(bed type)    Mobility Interventions: Pressure redistribution bed/mattress (bed type)    Nutrition Interventions: Document food/fluid/supplement intake    Friction and Shear Interventions: Apply protective barrier, creams and emollients                Problem: Pain  Goal: *Control of Pain  Outcome: Progressing Towards Goal 20200

## 2023-04-10 NOTE — PROGRESS NOTES
Per patient's Shira Poor, patient's baseline mentation: knows that she is in [facility] assisted living, knows her name, knows POA. Has a hard time remembering things short term. 1050 Spoke again with Jack Leroy, states that patient should be DNR. Patient is a 87y old  Female who presents with a chief complaint of Asthma with acute exacerbation     (10 Apr 2023 10:21)      Patient seen and examined at bedside. Patient's hemoglobin noted to be 6.6 overnight, received 1 unit PRBC. Hemoglobin stable this am.    ALLERGIES:  No Known Allergies    MEDICATIONS  (STANDING):  albuterol    0.083% 2.5 milliGRAM(s) Nebulizer every 6 hours  albuterol    90 MICROgram(s) HFA Inhaler 1 Puff(s) Inhalation every 4 hours  amLODIPine   Tablet 5 milliGRAM(s) Oral daily  atorvastatin 20 milliGRAM(s) Oral at bedtime  cloNIDine 0.2 milliGRAM(s) Oral two times a day  dextrose 5%. 1000 milliLiter(s) (50 mL/Hr) IV Continuous <Continuous>  dextrose 5%. 1000 milliLiter(s) (100 mL/Hr) IV Continuous <Continuous>  dextrose 50% Injectable 25 Gram(s) IV Push once  dextrose 50% Injectable 12.5 Gram(s) IV Push once  dextrose 50% Injectable 25 Gram(s) IV Push once  ferrous    sulfate 325 milliGRAM(s) Oral daily  folic acid 1 milliGRAM(s) Oral daily  glucagon  Injectable 1 milliGRAM(s) IntraMuscular once  insulin lispro (ADMELOG) corrective regimen sliding scale   SubCutaneous three times a day before meals  insulin lispro (ADMELOG) corrective regimen sliding scale   SubCutaneous at bedtime  levothyroxine 25 MICROGram(s) Oral daily  predniSONE   Tablet 40 milliGRAM(s) Oral daily    MEDICATIONS  (PRN):  acetaminophen     Tablet .. 650 milliGRAM(s) Oral every 6 hours PRN Temp greater or equal to 38C (100.4F), Mild Pain (1 - 3)  aluminum hydroxide/magnesium hydroxide/simethicone Suspension 30 milliLiter(s) Oral every 4 hours PRN Dyspepsia  dextrose Oral Gel 15 Gram(s) Oral once PRN Blood Glucose LESS THAN 70 milliGRAM(s)/deciliter  melatonin 3 milliGRAM(s) Oral at bedtime PRN Insomnia  ondansetron Injectable 4 milliGRAM(s) IV Push every 8 hours PRN Nausea and/or Vomiting    Vital Signs Last 24 Hrs  T(F): 98.1 (10 Apr 2023 05:24), Max: 98.1 (10 Apr 2023 05:24)  HR: 89 (10 Apr 2023 09:51) (64 - 99)  BP: 161/79 (10 Apr 2023 05:24) (110/67 - 161/79)  RR: 16 (10 Apr 2023 05:24) (15 - 18)  SpO2: 100% (10 Apr 2023 09:51) (99% - 100%)  I&O's Summary    2023 07:01  -  10 Apr 2023 07:00  --------------------------------------------------------  IN: 1460 mL / OUT: 400 mL / NET: 1060 mL      PHYSICAL EXAM:  General: NAD, A/O x 3  ENT: No gross hearing impairment, Moist mucous membranes, no thrush  Neck: Supple, No JVD  Lungs: Distant breath sounds to auscultation bilaterally, good air entry, non-labored breathing  Cardio: irregular RR, S1/S2, No murmur  Abdomen: Soft, Nontender, Nondistended; Bowel sounds present  Extremities: No calf tenderness, No cyanosis, No pitting edema    LABS:                        8.7    9.37  )-----------( 258      ( 10 Apr 2023 12:45 )             27.3     04-10    131  |  95  |  45  ----------------------------<  154  5.3   |  29  |  1.99    Ca    8.2      10 Apr 2023 06:11  Phos  4.5     04-08  Mg     2.4     04-08    TPro  6.5  /  Alb  2.8  /  TBili  0.2  /  DBili  x   /  AST  9   /  ALT  7   /  AlkPhos  76  04-07          PT/INR - ( 2023 15:30 )   PT: 11.0 sec;   INR: 0.95 ratio         PTT - ( 2023 15:30 )  PTT:31.7 sec  Lactate, Blood: 0.9 mmol/L (04-07 @ 15:30)      CARDIAC MARKERS ( 2023 20:10 )  x     / 18.9 ng/L / x     / x     / x            TSH 1.383   TSH with FT4 reflex --  Total T3 --      ABG - ( 2023 15:59 )  pH, Arterial: 7.40  pH, Blood: x     /  pCO2: 45    /  pO2: 196   / HCO3: 28    / Base Excess: 3.1   /  SaO2: 99.7                    POCT Blood Glucose.: 266 mg/dL (10 Apr 2023 11:39)  POCT Blood Glucose.: 199 mg/dL (10 Apr 2023 08:05)  POCT Blood Glucose.: 215 mg/dL (2023 21:32)  POCT Blood Glucose.: 341 mg/dL (2023 16:36)      Urinalysis Basic - ( 2023 18:55 )    Color: Yellow / Appearance: Clear / S.010 / pH: x  Gluc: x / Ketone: Negative  / Bili: Negative / Urobili: Negative   Blood: x / Protein: 30 mg/dL / Nitrite: Negative   Leuk Esterase: Moderate / RBC: 11-25 /HPF / WBC 11-25 /HPF   Sq Epi: x / Non Sq Epi: x / Bacteria: Moderate /HPF        Culture - Urine (collected 2023 18:55)  Source: Clean Catch Clean Catch (Midstream)  Final Report (2023 11:03):    >=3 organisms. Probable collection contamination.    Culture - Blood (collected 2023 15:30)  Source: .Blood Blood-Peripheral  Preliminary Report (2023 22:02):    No growth to date.    Culture - Blood (collected 2023 15:30)  Source: .Blood Blood-Peripheral  Preliminary Report (2023 22:02):    No growth to date.        RADIOLOGY & ADDITIONAL TESTS:    Care Discussed with Consultants/Other Providers:    Patient is a 87y old  Female who presents with a chief complaint of Asthma with acute exacerbation   (10 Apr 2023 10:21)    c/o abdominal discomfort.     Patient seen and examined at bedside. Patient's hemoglobin noted to be 6.6 overnight, received 1 unit PRBC. Hemoglobin stable this am.    ALLERGIES:  No Known Allergies    MEDICATIONS  (STANDING):  albuterol    0.083% 2.5 milliGRAM(s) Nebulizer every 6 hours  albuterol    90 MICROgram(s) HFA Inhaler 1 Puff(s) Inhalation every 4 hours  amLODIPine   Tablet 5 milliGRAM(s) Oral daily  atorvastatin 20 milliGRAM(s) Oral at bedtime  cloNIDine 0.2 milliGRAM(s) Oral two times a day  dextrose 5%. 1000 milliLiter(s) (50 mL/Hr) IV Continuous <Continuous>  dextrose 5%. 1000 milliLiter(s) (100 mL/Hr) IV Continuous <Continuous>  dextrose 50% Injectable 25 Gram(s) IV Push once  dextrose 50% Injectable 12.5 Gram(s) IV Push once  dextrose 50% Injectable 25 Gram(s) IV Push once  ferrous    sulfate 325 milliGRAM(s) Oral daily  folic acid 1 milliGRAM(s) Oral daily  glucagon  Injectable 1 milliGRAM(s) IntraMuscular once  insulin lispro (ADMELOG) corrective regimen sliding scale   SubCutaneous three times a day before meals  insulin lispro (ADMELOG) corrective regimen sliding scale   SubCutaneous at bedtime  levothyroxine 25 MICROGram(s) Oral daily  predniSONE   Tablet 40 milliGRAM(s) Oral daily    MEDICATIONS  (PRN):  acetaminophen     Tablet .. 650 milliGRAM(s) Oral every 6 hours PRN Temp greater or equal to 38C (100.4F), Mild Pain (1 - 3)  aluminum hydroxide/magnesium hydroxide/simethicone Suspension 30 milliLiter(s) Oral every 4 hours PRN Dyspepsia  dextrose Oral Gel 15 Gram(s) Oral once PRN Blood Glucose LESS THAN 70 milliGRAM(s)/deciliter  melatonin 3 milliGRAM(s) Oral at bedtime PRN Insomnia  ondansetron Injectable 4 milliGRAM(s) IV Push every 8 hours PRN Nausea and/or Vomiting    Vital Signs Last 24 Hrs  T(F): 98.1 (10 Apr 2023 05:24), Max: 98.1 (10 Apr 2023 05:24)  HR: 89 (10 Apr 2023 09:51) (64 - 99)  BP: 161/79 (10 Apr 2023 05:24) (110/67 - 161/79)  RR: 16 (10 Apr 2023 05:24) (15 - 18)  SpO2: 100% (10 Apr 2023 09:51) (99% - 100%)  I&O's Summary    2023 07:01  -  10 Apr 2023 07:00  --------------------------------------------------------  IN: 1460 mL / OUT: 400 mL / NET: 1060 mL    PHYSICAL EXAM:  General: NAD, A/O x 3, richa speaking  ENT: No gross hearing impairment, Moist mucous membranes, no thrush  Neck: Supple, No JVD  Lungs: Distant breath sounds to auscultation bilaterally, good air entry, non-labored breathing  Cardio: irregular RR, S1/S2, No murmur  Abdomen: Soft, Nontender, Nondistended; Bowel sounds present  Extremities: No calf tenderness, No cyanosis, No pitting edema    LABS:             8.7    9.37  )-----------( 258      ( 10 Apr 2023 12:45 )             27.3     04-10  131  |  95  |  45  ----------------------------<  154  5.3   |  29  |  1.99    Ca    8.2      10 Apr 2023 06:11  Phos  4.5     04-08  Mg     2.4     04-08    TPro  6.5  /  Alb  2.8  /  TBili  0.2  /  DBili  x   /  AST  9   /  ALT  7   /  AlkPhos  76  04-07    PT/INR - ( 2023 15:30 )   PT: 11.0 sec;   INR: 0.95 ratio       PTT - ( 2023 15:30 )  PTT:31.7 sec  Lactate, Blood: 0.9 mmol/L (04-07 @ 15:30)    CARDIAC MARKERS ( 2023 20:10 )  x     / 18.9 ng/L / x     / x     / x        TSH 1.383   TSH with FT4 reflex --  Total T3 --    ABG - ( 2023 15:59 )  pH, Arterial: 7.40  pH, Blood: x     /  pCO2: 45    /  pO2: 196   / HCO3: 28    / Base Excess: 3.1   /  SaO2: 99.7     POCT Blood Glucose.: 266 mg/dL (10 Apr 2023 11:39)  POCT Blood Glucose.: 199 mg/dL (10 Apr 2023 08:05)  POCT Blood Glucose.: 215 mg/dL (2023 21:32)  POCT Blood Glucose.: 341 mg/dL (2023 16:36)    Urinalysis Basic - ( 2023 18:55 )    Color: Yellow / Appearance: Clear / S.010 / pH: x  Gluc: x / Ketone: Negative  / Bili: Negative / Urobili: Negative   Blood: x / Protein: 30 mg/dL / Nitrite: Negative   Leuk Esterase: Moderate / RBC: 11-25 /HPF / WBC 11-25 /HPF   Sq Epi: x / Non Sq Epi: x / Bacteria: Moderate /HPF    Culture - Urine (collected 2023 18:55)  Source: Clean Catch Clean Catch (Midstream)  Final Report (2023 11:03):    >=3 organisms. Probable collection contamination.    Culture - Blood (collected 2023 15:30)  Source: .Blood Blood-Peripheral  Preliminary Report (2023 22:02):    No growth to date.    Culture - Blood (collected 2023 15:30)  Source: .Blood Blood-Peripheral  Preliminary Report (2023 22:02):    No growth to date.    RADIOLOGY & ADDITIONAL TESTS:    Care Discussed with Consultants/Other Providers:

## (undated) DEVICE — KIT COLON W/ 1.1OZ LUB AND 2 END

## (undated) DEVICE — KENDALL 500 SERIES DIAPHORETIC FOAM MONITORING ELECTRODE - TEAR DROP SHAPE ( 30/PK): Brand: KENDALL

## (undated) DEVICE — SNARE ENDO 2.4MMX230CM -- COLD EXACTO

## (undated) DEVICE — MEDI-VAC NON-CONDUCTIVE SUCTION TUBING: Brand: CARDINAL HEALTH

## (undated) DEVICE — FORCEPS BX L240CM JAW DIA2.8MM L CAP W/ NDL MIC MESH TOOTH

## (undated) DEVICE — SYR 50ML SLIP TIP NSAF LF STRL --

## (undated) DEVICE — FLEX ADVANTAGE 1500CC: Brand: FLEX ADVANTAGE

## (undated) DEVICE — BITE BLK ENDOSCP AD 54FR GRN POLYETH ENDOSCP W STRP SLD

## (undated) DEVICE — CONTAINER PREFIL FRMLN 15ML --

## (undated) DEVICE — DEVICE INFL 60ML 12ATM CONVENIENT LOK REL HNDL HI PRSS FLX

## (undated) DEVICE — BASIN EMESIS 500CC ROSE 250/CS 60/PLT: Brand: MEDEGEN MEDICAL PRODUCTS, LLC